# Patient Record
Sex: FEMALE | Race: OTHER | Employment: FULL TIME
[De-identification: names, ages, dates, MRNs, and addresses within clinical notes are randomized per-mention and may not be internally consistent; named-entity substitution may affect disease eponyms.]

---
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
## 2024-11-17 ENCOUNTER — HOSPITAL ENCOUNTER (EMERGENCY)
Age: 19
Discharge: HOME | End: 2024-11-17
Attending: STUDENT IN AN ORGANIZED HEALTH CARE EDUCATION/TRAINING PROGRAM
Payer: COMMERCIAL

## 2024-11-17 VITALS
HEIGHT: 61 IN | OXYGEN SATURATION: 98 % | SYSTOLIC BLOOD PRESSURE: 109 MMHG | WEIGHT: 125 LBS | BODY MASS INDEX: 23.6 KG/M2 | HEART RATE: 97 BPM | TEMPERATURE: 97.7 F | RESPIRATION RATE: 17 BRPM | DIASTOLIC BLOOD PRESSURE: 89 MMHG

## 2024-11-17 DIAGNOSIS — R10.30 LOWER ABDOMINAL PAIN: Primary | ICD-10-CM

## 2024-11-17 LAB
ALBUMIN SERPL-MCNC: 4.1 G/DL (ref 3.5–5.2)
ALP SERPL-CCNC: 67 U/L (ref 35–104)
ALT SERPL-CCNC: 9 U/L (ref 10–35)
ANION GAP SERPL CALCULATED.3IONS-SCNC: 10 MMOL/L (ref 9–16)
AST SERPL-CCNC: 17 U/L (ref 10–35)
B-HCG SERPL EIA 3RD IS-ACNC: <0.2 MIU/ML (ref 0–7)
BASOPHILS # BLD: 0 K/UL (ref 0–0.2)
BASOPHILS NFR BLD: 1 % (ref 0–2)
BILIRUB SERPL-MCNC: 0.4 MG/DL (ref 0–1.2)
BILIRUB UR QL STRIP: NEGATIVE
BUN SERPL-MCNC: 7 MG/DL (ref 6–20)
CALCIUM SERPL-MCNC: 9.1 MG/DL (ref 8.6–10.4)
CHLORIDE SERPL-SCNC: 104 MMOL/L (ref 98–107)
CLARITY UR: CLEAR
CO2 SERPL-SCNC: 24 MMOL/L (ref 20–31)
COLOR UR: YELLOW
COMMENT: NORMAL
CREAT SERPL-MCNC: 0.7 MG/DL (ref 0.7–1.2)
EOSINOPHIL # BLD: 0.1 K/UL (ref 0–0.4)
EOSINOPHILS RELATIVE PERCENT: 1 % (ref 0–4)
ERYTHROCYTE [DISTWIDTH] IN BLOOD BY AUTOMATED COUNT: 13.1 % (ref 11.5–14.9)
GFR, ESTIMATED: >90 ML/MIN/1.73M2
GLUCOSE SERPL-MCNC: 92 MG/DL (ref 74–99)
GLUCOSE UR STRIP-MCNC: NEGATIVE MG/DL
HCT VFR BLD AUTO: 46.1 % (ref 36–46)
HGB BLD-MCNC: 15.7 G/DL (ref 12–16)
HGB UR QL STRIP.AUTO: NEGATIVE
KETONES UR STRIP-MCNC: NEGATIVE MG/DL
LEUKOCYTE ESTERASE UR QL STRIP: NEGATIVE
LIPASE SERPL-CCNC: 100 U/L (ref 13–60)
LYMPHOCYTES NFR BLD: 3.4 K/UL (ref 1–4.8)
LYMPHOCYTES RELATIVE PERCENT: 35 % (ref 25–45)
MCH RBC QN AUTO: 30.5 PG (ref 26–34)
MCHC RBC AUTO-ENTMCNC: 34 G/DL (ref 31–37)
MCV RBC AUTO: 89.6 FL (ref 80–100)
MONOCYTES NFR BLD: 0.7 K/UL (ref 0.1–1.3)
MONOCYTES NFR BLD: 7 % (ref 2–8)
NEUTROPHILS NFR BLD: 56 % (ref 34–64)
NEUTS SEG NFR BLD: 5.5 K/UL (ref 1.3–9.1)
NITRITE UR QL STRIP: NEGATIVE
PH UR STRIP: 6 [PH] (ref 5–8)
PLATELET # BLD AUTO: 312 K/UL (ref 150–450)
PMV BLD AUTO: 8.4 FL (ref 6–12)
POTASSIUM SERPL-SCNC: 3.7 MMOL/L (ref 3.7–5.3)
PROT SERPL-MCNC: 7.1 G/DL (ref 6.6–8.7)
PROT UR STRIP-MCNC: NEGATIVE MG/DL
RBC # BLD AUTO: 5.15 M/UL (ref 4–5.2)
SODIUM SERPL-SCNC: 138 MMOL/L (ref 136–145)
SP GR UR STRIP: 1.01 (ref 1–1.03)
UROBILINOGEN UR STRIP-ACNC: NORMAL EU/DL (ref 0–1)
WBC OTHER # BLD: 9.7 K/UL (ref 4.5–13.5)

## 2024-11-17 PROCEDURE — 83690 ASSAY OF LIPASE: CPT

## 2024-11-17 PROCEDURE — 36415 COLL VENOUS BLD VENIPUNCTURE: CPT

## 2024-11-17 PROCEDURE — 80053 COMPREHEN METABOLIC PANEL: CPT

## 2024-11-17 PROCEDURE — 84702 CHORIONIC GONADOTROPIN TEST: CPT

## 2024-11-17 PROCEDURE — 81003 URINALYSIS AUTO W/O SCOPE: CPT

## 2024-11-17 PROCEDURE — 85025 COMPLETE CBC W/AUTO DIFF WBC: CPT

## 2024-11-17 PROCEDURE — 99283 EMERGENCY DEPT VISIT LOW MDM: CPT

## 2024-11-17 ASSESSMENT — PAIN - FUNCTIONAL ASSESSMENT: PAIN_FUNCTIONAL_ASSESSMENT: 0-10

## 2024-11-17 ASSESSMENT — PAIN DESCRIPTION - DESCRIPTORS: DESCRIPTORS: SHARP

## 2024-11-17 ASSESSMENT — PAIN DESCRIPTION - ORIENTATION: ORIENTATION: LOWER

## 2024-11-17 ASSESSMENT — PAIN SCALES - GENERAL: PAINLEVEL_OUTOF10: 7

## 2024-11-17 ASSESSMENT — LIFESTYLE VARIABLES
HOW MANY STANDARD DRINKS CONTAINING ALCOHOL DO YOU HAVE ON A TYPICAL DAY: PATIENT DOES NOT DRINK
HOW OFTEN DO YOU HAVE A DRINK CONTAINING ALCOHOL: NEVER

## 2024-11-17 ASSESSMENT — PAIN DESCRIPTION - LOCATION: LOCATION: ABDOMEN

## 2024-11-17 ASSESSMENT — PAIN DESCRIPTION - FREQUENCY: FREQUENCY: CONTINUOUS

## 2024-11-18 NOTE — ED PROVIDER NOTES
EMERGENCY DEPARTMENT ENCOUNTER   ATTENDING ATTESTATION     Pt Name: Corry Ruiz  MRN: [id number]  Birthdate 2005  Date of evaluation: 11/17/24       Corry Ruiz is a 19 y.o. female who presents with Abdominal Pain (Sharp lingering pain started 2 weeks, thought she was pregnant, did 3 self- pregnancy test at home showed 2 vague positive results, one was negative ) and Pregnancy Test (Patient wants to know if she is pregnant)    Lower abdominal pain suprapubic area    Normal vital signs, benign exam    Labs and pregnancy test    MDM:     Patient with reassuring workup    Lipase minimally elevated she has no epigastric tenderness is not 3 times the upper limit of normal    No pregnancy normal vitals otherwise    Will discharge    Vitals:   Vitals:    11/17/24 2125   BP: 109/89   Pulse: 97   Resp: 17   Temp: 97.7 °F (36.5 °C)   TempSrc: Oral   SpO2: 98%   Weight: 56.7 kg (125 lb)   Height: 1.549 m (5' 1\")         I personally saw and examined the patient. I have reviewed and agree with the resident's findings, including all diagnostic interpretations and treatment plan as written. I was present for the key portions of any procedures performed and the inclusive time noted for any critical care statement.    Slim Templeton MD  Attending Emergency Physician            Slim Templeton MD  11/18/24 0002    
round, and reactive to light.   Cardiovascular:      Rate and Rhythm: Normal rate and regular rhythm.      Pulses:           Radial pulses are 2+ on the right side and 2+ on the left side.        Dorsalis pedis pulses are 2+ on the right side and 2+ on the left side.      Heart sounds: Normal heart sounds.   Pulmonary:      Effort: Pulmonary effort is normal. No respiratory distress.      Breath sounds: Normal breath sounds.   Abdominal:      Palpations: Abdomen is soft.      Tenderness: There is no abdominal tenderness.   Musculoskeletal:         General: No tenderness. Normal range of motion.   Skin:     General: Skin is warm and dry.      Findings: No rash.   Neurological:      Mental Status: She is alert and oriented to person, place, and time.           DDX/DIAGNOSTIC RESULTS / EMERGENCY DEPARTMENT COURSE / MDM     Medical Decision Making  Amount and/or Complexity of Data Reviewed  Labs: ordered. Decision-making details documented in ED Course.    19-year-old female presented ED with lower abdominal pain and concern for pregnancy.  On physical examination patient was stable vital signs, afebrile with lower abdominal pain to palpation.  Will obtain CBC, CMP, lipase, hCG and UA.    EKG      All EKG's are interpreted by the Emergency Department Physician who either signs or Co-signs this chart in the absence of a cardiologist.    EMERGENCY DEPARTMENT COURSE:      ED Course as of 11/17/24 2326   Sun Nov 17, 2024 2316 CBC with Auto Differential(!):    WBC 9.7   RBC 5.15   Hemoglobin Quant 15.7   Hematocrit 46.1(!)   MCV 89.6   MCH 30.5   MCHC 34.0   RDW 13.1   Platelet Count 312   MPV 8.4   Neutrophils % 56   Lymphocyte % 35   Monocytes % 7   Eosinophils % 1   Basophils % 1   Neutrophils Absolute 5.50   Lymphocytes Absolute 3.40   Monocytes Absolute 0.70   Eosinophils Absolute 0.10   Basophils Absolute 0.00 [AS]   2316 Comprehensive Metabolic Panel(!):    Sodium 138   Potassium 3.7   Chloride 104   CARBON DIOXIDE

## 2024-11-18 NOTE — DISCHARGE INSTRUCTIONS
You are seen in the ED for abdominal pain.    Your labs came back normal.  You tested negative for pregnancy.    Please follow-up with your PCP if symptoms continue to persist over the next 3 to 5 days.  Information for such an outpatient appointment has been provided

## 2024-11-18 NOTE — ED NOTES
Mode of arrival (squad #, walk in, police, etc) : walk-in        C= \"Have you ever felt that you should Cut down on your drinking?\"  No  A= \"Have people Annoyed you by criticizing your drinking?\"  No  G= \"Have you ever felt bad or Guilty about your drinking?\"  No  E= \"Have you ever had a drink as an Eye-opener first thing in the morning to steady your nerves or to help a hangover?\"  No      Deferred []      Reason for deferring: N/A    *If yes to two or more: probable alcohol abuse.*

## 2025-07-18 PROCEDURE — 99245 OFF/OP CONSLTJ NEW/EST HI 55: CPT | Performed by: PSYCHIATRY & NEUROLOGY

## 2025-07-19 ENCOUNTER — HOSPITAL ENCOUNTER (OUTPATIENT)
Age: 20
Setting detail: OBSERVATION
Discharge: ANOTHER HOSPITAL | DRG: 756 | End: 2025-07-20
Attending: STUDENT IN AN ORGANIZED HEALTH CARE EDUCATION/TRAINING PROGRAM | Admitting: FAMILY MEDICINE
Payer: COMMERCIAL

## 2025-07-19 ENCOUNTER — APPOINTMENT (OUTPATIENT)
Dept: CT IMAGING | Age: 20
DRG: 756 | End: 2025-07-19
Payer: COMMERCIAL

## 2025-07-19 DIAGNOSIS — F10.920 ACUTE ALCOHOLIC INTOXICATION WITHOUT COMPLICATION: Primary | ICD-10-CM

## 2025-07-19 DIAGNOSIS — R45.851 SUICIDAL IDEATION: ICD-10-CM

## 2025-07-19 DIAGNOSIS — R56.9 SEIZURE-LIKE ACTIVITY (HCC): ICD-10-CM

## 2025-07-19 LAB
AMPHET UR QL SCN: NEGATIVE
APAP SERPL-MCNC: <5 UG/ML (ref 10–30)
BARBITURATES UR QL SCN: NEGATIVE
BASOPHILS # BLD: 0.03 K/UL (ref 0–0.2)
BASOPHILS NFR BLD: 0 % (ref 0–2)
BENZODIAZ UR QL: POSITIVE
BILIRUB UR QL STRIP: NEGATIVE
CANNABINOIDS UR QL SCN: NEGATIVE
CHP ED QC CHECK: NORMAL
CLARITY UR: ABNORMAL
COCAINE UR QL SCN: NEGATIVE
COLOR UR: YELLOW
EOSINOPHIL # BLD: <0.03 K/UL (ref 0–0.44)
EOSINOPHILS RELATIVE PERCENT: 0 % (ref 1–4)
EPI CELLS #/AREA URNS HPF: ABNORMAL /HPF (ref 0–5)
ERYTHROCYTE [DISTWIDTH] IN BLOOD BY AUTOMATED COUNT: 12.3 % (ref 11.8–14.4)
ETHANOL PERCENT: 0.13 %
ETHANOLAMINE SERPL-MCNC: 128 MG/DL (ref 0–0.08)
FENTANYL UR QL: NEGATIVE
GLUCOSE BLD-MCNC: 90 MG/DL
GLUCOSE BLD-MCNC: 90 MG/DL (ref 65–105)
GLUCOSE UR STRIP-MCNC: NEGATIVE MG/DL
HCG SERPL QL: NEGATIVE
HCG UR QL: NEGATIVE
HCT VFR BLD AUTO: 40.6 % (ref 36.3–47.1)
HGB BLD-MCNC: 14 G/DL (ref 11.9–15.1)
HGB UR QL STRIP.AUTO: ABNORMAL
IMM GRANULOCYTES # BLD AUTO: 0.07 K/UL (ref 0–0.3)
IMM GRANULOCYTES NFR BLD: 1 %
KETONES UR STRIP-MCNC: NEGATIVE MG/DL
LEUKOCYTE ESTERASE UR QL STRIP: ABNORMAL
LYMPHOCYTES NFR BLD: 1.35 K/UL (ref 1.1–3.7)
LYMPHOCYTES RELATIVE PERCENT: 10 % (ref 24–43)
MCH RBC QN AUTO: 30.8 PG (ref 25.2–33.5)
MCHC RBC AUTO-ENTMCNC: 34.5 G/DL (ref 28.4–34.8)
MCV RBC AUTO: 89.4 FL (ref 82.6–102.9)
METHADONE UR QL: NEGATIVE
MONOCYTES NFR BLD: 0.62 K/UL (ref 0.1–1.2)
MONOCYTES NFR BLD: 5 % (ref 3–12)
NEUTROPHILS NFR BLD: 84 % (ref 36–65)
NEUTS SEG NFR BLD: 10.88 K/UL (ref 1.5–8.1)
NITRITE UR QL STRIP: NEGATIVE
NRBC BLD-RTO: 0 PER 100 WBC
OPIATES UR QL SCN: NEGATIVE
OXYCODONE UR QL SCN: NEGATIVE
PCP UR QL SCN: NEGATIVE
PH UR STRIP: 6 [PH] (ref 5–8)
PLATELET # BLD AUTO: 301 K/UL (ref 138–453)
PMV BLD AUTO: 9.7 FL (ref 8.1–13.5)
PROT UR STRIP-MCNC: NEGATIVE MG/DL
RBC # BLD AUTO: 4.54 M/UL (ref 3.95–5.11)
RBC #/AREA URNS HPF: ABNORMAL /HPF (ref 0–2)
SALICYLATES SERPL-MCNC: <0.5 MG/DL (ref 0–10)
SP GR UR STRIP: 1.02 (ref 1–1.03)
TEST INFORMATION: ABNORMAL
UROBILINOGEN UR STRIP-ACNC: NORMAL EU/DL (ref 0–1)
WBC #/AREA URNS HPF: ABNORMAL /HPF (ref 0–5)
WBC OTHER # BLD: 13 K/UL (ref 3.5–11.3)

## 2025-07-19 PROCEDURE — 96376 TX/PRO/DX INJ SAME DRUG ADON: CPT

## 2025-07-19 PROCEDURE — 84703 CHORIONIC GONADOTROPIN ASSAY: CPT

## 2025-07-19 PROCEDURE — 85025 COMPLETE CBC W/AUTO DIFF WBC: CPT

## 2025-07-19 PROCEDURE — 96375 TX/PRO/DX INJ NEW DRUG ADDON: CPT

## 2025-07-19 PROCEDURE — 2500000003 HC RX 250 WO HCPCS: Performed by: PSYCHIATRY & NEUROLOGY

## 2025-07-19 PROCEDURE — G0480 DRUG TEST DEF 1-7 CLASSES: HCPCS

## 2025-07-19 PROCEDURE — 6370000000 HC RX 637 (ALT 250 FOR IP): Performed by: EMERGENCY MEDICINE

## 2025-07-19 PROCEDURE — 80307 DRUG TEST PRSMV CHEM ANLYZR: CPT

## 2025-07-19 PROCEDURE — 80179 DRUG ASSAY SALICYLATE: CPT

## 2025-07-19 PROCEDURE — 96372 THER/PROPH/DIAG INJ SC/IM: CPT

## 2025-07-19 PROCEDURE — 2580000003 HC RX 258: Performed by: FAMILY MEDICINE

## 2025-07-19 PROCEDURE — 94761 N-INVAS EAR/PLS OXIMETRY MLT: CPT

## 2025-07-19 PROCEDURE — 81001 URINALYSIS AUTO W/SCOPE: CPT

## 2025-07-19 PROCEDURE — 6360000002 HC RX W HCPCS

## 2025-07-19 PROCEDURE — 99285 EMERGENCY DEPT VISIT HI MDM: CPT

## 2025-07-19 PROCEDURE — 6360000002 HC RX W HCPCS: Performed by: STUDENT IN AN ORGANIZED HEALTH CARE EDUCATION/TRAINING PROGRAM

## 2025-07-19 PROCEDURE — 96374 THER/PROPH/DIAG INJ IV PUSH: CPT

## 2025-07-19 PROCEDURE — G0378 HOSPITAL OBSERVATION PER HR: HCPCS

## 2025-07-19 PROCEDURE — 6370000000 HC RX 637 (ALT 250 FOR IP): Performed by: FAMILY MEDICINE

## 2025-07-19 PROCEDURE — 80143 DRUG ASSAY ACETAMINOPHEN: CPT

## 2025-07-19 PROCEDURE — 70450 CT HEAD/BRAIN W/O DYE: CPT

## 2025-07-19 PROCEDURE — 81025 URINE PREGNANCY TEST: CPT

## 2025-07-19 PROCEDURE — 6360000002 HC RX W HCPCS: Performed by: FAMILY MEDICINE

## 2025-07-19 PROCEDURE — 82947 ASSAY GLUCOSE BLOOD QUANT: CPT

## 2025-07-19 RX ORDER — LORAZEPAM 2 MG/ML
4 INJECTION INTRAMUSCULAR
Status: DISCONTINUED | OUTPATIENT
Start: 2025-07-19 | End: 2025-07-20 | Stop reason: HOSPADM

## 2025-07-19 RX ORDER — ONDANSETRON 4 MG/1
4 TABLET, ORALLY DISINTEGRATING ORAL EVERY 8 HOURS PRN
Status: DISCONTINUED | OUTPATIENT
Start: 2025-07-19 | End: 2025-07-20

## 2025-07-19 RX ORDER — GAUZE BANDAGE 2" X 2"
100 BANDAGE TOPICAL DAILY
Status: DISCONTINUED | OUTPATIENT
Start: 2025-07-19 | End: 2025-07-20

## 2025-07-19 RX ORDER — IBUPROFEN 600 MG/1
600 TABLET, FILM COATED ORAL ONCE
Status: COMPLETED | OUTPATIENT
Start: 2025-07-19 | End: 2025-07-19

## 2025-07-19 RX ORDER — MIDAZOLAM HYDROCHLORIDE 2 MG/2ML
2 INJECTION, SOLUTION INTRAMUSCULAR; INTRAVENOUS ONCE
Status: DISCONTINUED | OUTPATIENT
Start: 2025-07-19 | End: 2025-07-20 | Stop reason: HOSPADM

## 2025-07-19 RX ORDER — MIDAZOLAM HYDROCHLORIDE 2 MG/2ML
2 INJECTION, SOLUTION INTRAMUSCULAR; INTRAVENOUS ONCE
Status: COMPLETED | OUTPATIENT
Start: 2025-07-19 | End: 2025-07-19

## 2025-07-19 RX ORDER — ACETAMINOPHEN 325 MG/1
650 TABLET ORAL EVERY 6 HOURS PRN
Status: DISCONTINUED | OUTPATIENT
Start: 2025-07-19 | End: 2025-07-20

## 2025-07-19 RX ORDER — SODIUM CHLORIDE 9 MG/ML
INJECTION, SOLUTION INTRAVENOUS CONTINUOUS
Status: DISCONTINUED | OUTPATIENT
Start: 2025-07-19 | End: 2025-07-20 | Stop reason: HOSPADM

## 2025-07-19 RX ORDER — LORAZEPAM 1 MG/1
1 TABLET ORAL
Status: DISCONTINUED | OUTPATIENT
Start: 2025-07-19 | End: 2025-07-20 | Stop reason: HOSPADM

## 2025-07-19 RX ORDER — MAGNESIUM SULFATE IN WATER 40 MG/ML
2000 INJECTION, SOLUTION INTRAVENOUS PRN
Status: DISCONTINUED | OUTPATIENT
Start: 2025-07-19 | End: 2025-07-20

## 2025-07-19 RX ORDER — LORAZEPAM 2 MG/ML
2 INJECTION INTRAMUSCULAR
Status: DISCONTINUED | OUTPATIENT
Start: 2025-07-19 | End: 2025-07-20 | Stop reason: HOSPADM

## 2025-07-19 RX ORDER — FOLIC ACID 1 MG/1
1 TABLET ORAL DAILY
Status: DISCONTINUED | OUTPATIENT
Start: 2025-07-19 | End: 2025-07-20 | Stop reason: HOSPADM

## 2025-07-19 RX ORDER — ONDANSETRON 2 MG/ML
4 INJECTION INTRAMUSCULAR; INTRAVENOUS EVERY 6 HOURS PRN
Status: DISCONTINUED | OUTPATIENT
Start: 2025-07-19 | End: 2025-07-20

## 2025-07-19 RX ORDER — LORAZEPAM 1 MG/1
4 TABLET ORAL
Status: DISCONTINUED | OUTPATIENT
Start: 2025-07-19 | End: 2025-07-20 | Stop reason: HOSPADM

## 2025-07-19 RX ORDER — FAMOTIDINE 20 MG/1
20 TABLET, FILM COATED ORAL 2 TIMES DAILY
Status: DISCONTINUED | OUTPATIENT
Start: 2025-07-19 | End: 2025-07-20 | Stop reason: HOSPADM

## 2025-07-19 RX ORDER — MAGNESIUM HYDROXIDE/ALUMINUM HYDROXICE/SIMETHICONE 120; 1200; 1200 MG/30ML; MG/30ML; MG/30ML
30 SUSPENSION ORAL EVERY 6 HOURS PRN
Status: DISCONTINUED | OUTPATIENT
Start: 2025-07-19 | End: 2025-07-20 | Stop reason: HOSPADM

## 2025-07-19 RX ORDER — FOLIC ACID 1 MG/1
1 TABLET ORAL DAILY
Status: DISCONTINUED | OUTPATIENT
Start: 2025-07-20 | End: 2025-07-19

## 2025-07-19 RX ORDER — POLYETHYLENE GLYCOL 3350 17 G/17G
17 POWDER, FOR SOLUTION ORAL DAILY PRN
Status: DISCONTINUED | OUTPATIENT
Start: 2025-07-19 | End: 2025-07-20

## 2025-07-19 RX ORDER — LORAZEPAM 2 MG/ML
1 INJECTION INTRAMUSCULAR EVERY 4 HOURS PRN
Status: DISCONTINUED | OUTPATIENT
Start: 2025-07-19 | End: 2025-07-20

## 2025-07-19 RX ORDER — LEVETIRACETAM 500 MG/5ML
1500 INJECTION, SOLUTION, CONCENTRATE INTRAVENOUS ONCE
Status: COMPLETED | OUTPATIENT
Start: 2025-07-19 | End: 2025-07-19

## 2025-07-19 RX ORDER — ACETAMINOPHEN 650 MG/1
650 SUPPOSITORY RECTAL EVERY 6 HOURS PRN
Status: DISCONTINUED | OUTPATIENT
Start: 2025-07-19 | End: 2025-07-20

## 2025-07-19 RX ORDER — MIDAZOLAM HYDROCHLORIDE 1 MG/ML
INJECTION, SOLUTION INTRAMUSCULAR; INTRAVENOUS
Status: COMPLETED
Start: 2025-07-19 | End: 2025-07-19

## 2025-07-19 RX ORDER — GAUZE BANDAGE 2" X 2"
100 BANDAGE TOPICAL DAILY
Status: DISCONTINUED | OUTPATIENT
Start: 2025-07-20 | End: 2025-07-19

## 2025-07-19 RX ORDER — LORAZEPAM 2 MG/ML
3 INJECTION INTRAMUSCULAR
Status: DISCONTINUED | OUTPATIENT
Start: 2025-07-19 | End: 2025-07-20 | Stop reason: HOSPADM

## 2025-07-19 RX ORDER — MULTIVITAMIN WITH IRON
1 TABLET ORAL DAILY
Status: DISCONTINUED | OUTPATIENT
Start: 2025-07-19 | End: 2025-07-20

## 2025-07-19 RX ORDER — ONDANSETRON 2 MG/ML
4 INJECTION INTRAMUSCULAR; INTRAVENOUS ONCE
Status: COMPLETED | OUTPATIENT
Start: 2025-07-19 | End: 2025-07-19

## 2025-07-19 RX ORDER — LORAZEPAM 2 MG/ML
1 INJECTION INTRAMUSCULAR
Status: DISCONTINUED | OUTPATIENT
Start: 2025-07-19 | End: 2025-07-20 | Stop reason: HOSPADM

## 2025-07-19 RX ORDER — POTASSIUM CHLORIDE 1500 MG/1
40 TABLET, EXTENDED RELEASE ORAL PRN
Status: DISCONTINUED | OUTPATIENT
Start: 2025-07-19 | End: 2025-07-20

## 2025-07-19 RX ORDER — LORAZEPAM 1 MG/1
2 TABLET ORAL
Status: DISCONTINUED | OUTPATIENT
Start: 2025-07-19 | End: 2025-07-20 | Stop reason: HOSPADM

## 2025-07-19 RX ORDER — POTASSIUM CHLORIDE 7.45 MG/ML
10 INJECTION INTRAVENOUS PRN
Status: DISCONTINUED | OUTPATIENT
Start: 2025-07-19 | End: 2025-07-20

## 2025-07-19 RX ORDER — LORAZEPAM 1 MG/1
3 TABLET ORAL
Status: DISCONTINUED | OUTPATIENT
Start: 2025-07-19 | End: 2025-07-20 | Stop reason: HOSPADM

## 2025-07-19 RX ADMIN — ACETAMINOPHEN 650 MG: 325 TABLET ORAL at 16:21

## 2025-07-19 RX ADMIN — MIDAZOLAM HYDROCHLORIDE 2 MG: 1 INJECTION, SOLUTION INTRAMUSCULAR; INTRAVENOUS at 04:01

## 2025-07-19 RX ADMIN — ONDANSETRON 4 MG: 2 INJECTION, SOLUTION INTRAMUSCULAR; INTRAVENOUS at 04:16

## 2025-07-19 RX ADMIN — MULTIVITAMIN TABLET 1 TABLET: TABLET at 16:21

## 2025-07-19 RX ADMIN — IBUPROFEN 600 MG: 600 TABLET ORAL at 13:08

## 2025-07-19 RX ADMIN — MIDAZOLAM HYDROCHLORIDE 2 MG: 1 INJECTION, SOLUTION INTRAMUSCULAR; INTRAVENOUS at 04:45

## 2025-07-19 RX ADMIN — LEVETIRACETAM 1500 MG: 100 INJECTION INTRAVENOUS at 04:17

## 2025-07-19 RX ADMIN — Medication 100 MG: at 16:21

## 2025-07-19 RX ADMIN — BRIVARACETAM 200 MG: 50 INJECTION, SUSPENSION INTRAVENOUS at 21:41

## 2025-07-19 RX ADMIN — Medication 1 MG: at 16:40

## 2025-07-19 RX ADMIN — MIDAZOLAM HYDROCHLORIDE 2 MG: 1 INJECTION, SOLUTION INTRAMUSCULAR; INTRAVENOUS at 05:20

## 2025-07-19 RX ADMIN — FOLIC ACID 1 MG: 1 TABLET ORAL at 16:21

## 2025-07-19 RX ADMIN — SODIUM CHLORIDE: 0.9 INJECTION, SOLUTION INTRAVENOUS at 21:00

## 2025-07-19 RX ADMIN — Medication 1 MG: at 21:06

## 2025-07-19 ASSESSMENT — PAIN SCALES - GENERAL: PAINLEVEL_OUTOF10: 3

## 2025-07-19 NOTE — ED NOTES
Pt actively seizing. Lasted approx 45 seconds. Pt talking post seizure and was able to give us her name and . Pts mother was called to inform her of her daughter being in the hospital.

## 2025-07-19 NOTE — PROGRESS NOTES
Patient having seizure like activity while writer is in the room. Episode starts with patient expressing anxiety, SOB, and chest tightness. Patient becomes intermittently tachycardic and tachypnic then closes eyes, begins to have episodes of clonic appearing movements lasting approximately 45 seconds. Immediately after the patient is able to open eyes to name and answer questions while tachycardia and tachypnia resolve. She complains of numbness and tingling in hands, feet, and lips. PRN order for ativan administered. Neurologist present immediately \"post ictal\" for exam
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
## 2025-07-19 NOTE — ED PROVIDER NOTES
ADDENDUM:      Care of this patient was assumed from Dr. Cooley.  The patient was seen for AMS.  The patient's initial evaluation and plan have been discussed with the prior provider who initially evaluated the patient.  Nursing Notes, Past Medical Hx, Past Surgical Hx, Social Hx, Allergies, and Family Hx were all reviewed.    RECENT VITALS:  BP: 117/75, Temp: 97.7 °F (36.5 °C), Pulse: 80, Respirations: 15     Medical Decision Making      Patient presented acutely intoxicated.  She was found to have intermittent seizure-like activity though it is questionable whether this was true seizures versus pseudoseizures.  She was treated with Versed.  She was found to have elevated ethanol levels as expected.  Once clinically sober, she expressed thoughts of suicidal ideation with plans to drink herself to death.  I discussed this with the patient and she confirms that she has had thoughts of wanting to no longer be alive.  She admits to a history of psychiatric admission in the past when she was 15 years old.  She is not currently on any psychiatric medications.  She is agreeable to psychiatric evaluation at [facility] and will be transferred for further care.  She is medically clear at this time.    Disposition   CLINICAL IMPRESSION:  1. Acute alcoholic intoxication without complication    2. Suicidal ideation        PATIENT REFERRED TO:  No follow-up provider specified.    DISCHARGE MEDICATIONS:  New Prescriptions    No medications on file       DISPOSITION:   Transfer to [facility] mental health unit - medically cleared for admission     (Please note that portions of this note were completed with a voice recognition program.  Efforts were made to edit the dictations but occasionally words are mis-transcribed.)         Daniel Arroyo MD  07/19/25 3311

## 2025-07-19 NOTE — ED NOTES
[x] [facility]    [] [facility]    []  [place] [facility]       SUICIDE/SECURITY WATCH EMERGENCY DEPARTMENT    Orders    [x]  Suicide/Security Watch Precautions initiated as checked below:   7/19/25 9:06 AM EDT STA10/10    [x] Notified physician:  Daniel Arroyo MD  7/19/25 9:06 AM EDT    [x] Orders obtained as appropriate:     [x] 1:1 Observer    [x] 1:1 Observer, Notified by:  Inna Taylor, RN    Contact Nurse Supervisor    [x] Safety Meal Tray Ordered, if applicable, via phone to kitchen (no EPIC order)    [x] Remove all personal clothes from room, placed in belonging bag, and placed behind nurses station.Patients placed in snap/paper gown/pants.     [x] Remove all personal belongings from room and secured away from patient.   All personal clothing and belongings include the following:    [x] Shirt              [] Hat      []   [x] Pants             [x] Necklace/ring/watch   [] Purse  [] Coat/Jacket            [] Wallet      [] Other: Please document in EPIC  [] Socks             [x] Cell phone     [] Other: Please document in EPIC  [x] Shoes/boots/slippers        [] Tablet      [] Other: Please document in EPIC         Documentation     [x] Suicide Precautions documented under Required Documentation       [x] C-SSRS Screening and C-SSRS Risk Assessment Completed     [x] Patient placed in \"Suicide Precautions\" in Epic     [x] Initiate paper constant observer flowsheet with      [x] 1:1 Constant Observer order in place (if HIGH RISK); instructions provided.  Suicide precautions require observer be within 4-7 feet      [x] Initiate every 15 minute observations per observer as delegated by the RN.     [x] Initiate RN assessment and documentation      These items or items similar should be removed from the room and belongings searched by [facility]. Personal belongings then secured in nurses station:(Patient has the right to call-light. 1:1 sitter allows this)   [x] Chairs

## 2025-07-19 NOTE — ED NOTES
Spoke with pts mother a few times. She states that pt has been admitted to the hospital 4 other times due to alcohol related seizures.

## 2025-07-19 NOTE — ED NOTES
Pt states she wants to leave and states she is no longer suicidal. MD aware.   Pt states \"What happens if I just leave?   Writer reminds patient she is \"pink slipped\" and what that entails.

## 2025-07-19 NOTE — ED PROVIDER NOTES
[facility] EMERGENCY DEPARTMENT ENCOUNTER      Pt Name: Corry Ruiz  MRN: [id number]  Birthdate 2005  Date of evaluation: 7/19/25    CHIEF COMPLAINT       Chief Complaint   Patient presents with    Altered Mental Status     EMS was called for seizure like activity. Pt has alcohol on board. Pt is unknown to us at this time       HISTORY OF PRESENT ILLNESS   Corry Ruiz is a 20 y.o. female who presents with altered mental status, intoxication, reported seizure-like activity.  EMS was called to a local hotel when patient's friend states that she is having seizures.  History initially is limited.  After patient awakens she did states she does have history of seizures especially when drinking.  On no medications for it.  Stating she has been drinking due to her depression and has been drinking and attempted self-harm or self    PASTMEDICAL HISTORY   No past medical history on file.  Past Problem List  Patient Active Problem List   Diagnosis Code    Suicidal ideation R45.851       SURGICAL HISTORY     No past surgical history on file.    CURRENT MEDICATIONS     There are no discharge medications for this patient.      ALLERGIES     has no allergies on file.    FAMILY HISTORY     has no family status information on file.        SOCIAL HISTORY          PHYSICAL EXAM     INITIAL VITALS: BP 98/63   Pulse 63   Temp 97.9 °F (36.6 °C) (Oral)   Resp 16   SpO2 99%    Physical Exam  Vitals and nursing note reviewed.   Constitutional:       General: She is not in acute distress.     Appearance: She is well-developed. She is not ill-appearing or toxic-appearing.   HENT:      Head: Normocephalic and atraumatic.   Eyes:      General: No scleral icterus.     Conjunctiva/sclera: Conjunctivae normal.      Pupils: Pupils are equal, round, and reactive to light.   Cardiovascular:      Rate and Rhythm: Normal rate.   Pulmonary:      Effort: Pulmonary effort is normal. No respiratory distress.   Musculoskeletal:         General:  Normal range of motion.   Skin:     General: Skin is warm and dry.      Findings: No erythema or rash.   Neurological:      Mental Status: She is alert.      Comments: Patient is awake alert answering questions appropriately initially somnolent and postictal appearing   Psychiatric:         Behavior: Behavior normal.         MEDICAL DECISION MAKING / ED COURSE:   Summary of Patient Presentation:      1)  Number and Complexity of Problems  Problem List This Visit:    1. Acute alcoholic intoxication without complication    2. Suicidal ideation    3. Seizure-like activity (HCC)        Differential Diagnosis: Intracranial hemorrhage, postictal state    Diagnoses Considered but Do Not Suspect: Pneumonia, appendicitis    Pertinent Comorbid Conditions:  No past medical history on file.    2)  Data Reviewed    External Documents Reviewed: Previous ER visits reviewed by myself      3)  Treatment and Disposition  [Patient repeat assessment, Disposition discussion with patient/family, Case discussed with consulting clinician, MIPS, Social determinants of health impacting treatment or disposition, Shared Decision Making, Code Status Discussion:]    Patient had multiple episodes of seizure-like activity.  Does not appear epileptic in nature with no tongue biting, urinary incontinence.  Significant hyperventilation.  Given Keppra.  Signout on physician awaiting completion of workup.  Will need BHI placement.  CT imaging was obtained however is not crossing and chart is negative.    DATA FOR LAB AND RADIOLOGY TESTS ORDERED BELOW ARE REVIEWED BY THE ED CLINICIAN:    RADIOLOGY: All x-rays, CT, MRI, and formal ultrasound images (except ED bedside ultrasound) are read by the radiologist, see reports below, unless otherwise noted in MDM or here.  Reports below are reviewed by myself.  CT HEAD WO CONTRAST    (Results Pending)       LABS: Lab orders shown below, the results are reviewed by myself, and all abnormals are listed

## 2025-07-19 NOTE — PROGRESS NOTES
Dr Hopkins requesting records of previous seizure activity and neuro work up. No such records found on chart review. No such records found on chart review of linked patient record. No such records found via care everywhere. Attempted to call [facility] records but no answer; they are closed on the weekend. Please attempt to obtain records on Monday
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
## 2025-07-19 NOTE — PROGRESS NOTES
Dr Hopkins ordered EEG. Call placed to the performing department. Notified that they will complete the procedure tomorrow.

## 2025-07-19 NOTE — ED NOTES
Pt tearful and asked for mental health help. Pt states she was drinking last night so she didn't have to feel things. Pt asked for
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
## 2025-07-19 NOTE — PROGRESS NOTES
Dr Hopkins notified of EEG being done tomorrow, ER visit at [facility] on 1/29/25 referring to patient \"shaking\". Writer clarifies whether or not patient should be on an schedule anti-seizure meds. Awaiting orders

## 2025-07-20 ENCOUNTER — APPOINTMENT (OUTPATIENT)
Dept: MRI IMAGING | Age: 20
DRG: 756 | End: 2025-07-20
Payer: COMMERCIAL

## 2025-07-20 ENCOUNTER — HOSPITAL ENCOUNTER (INPATIENT)
Age: 20
LOS: 3 days | Discharge: HOME | DRG: 880 | End: 2025-07-23
Attending: STUDENT IN AN ORGANIZED HEALTH CARE EDUCATION/TRAINING PROGRAM | Admitting: PSYCHIATRY & NEUROLOGY
Payer: MEDICAID

## 2025-07-20 VITALS
WEIGHT: 148.59 LBS | HEART RATE: 108 BPM | RESPIRATION RATE: 19 BRPM | SYSTOLIC BLOOD PRESSURE: 109 MMHG | BODY MASS INDEX: 24.76 KG/M2 | HEIGHT: 65 IN | TEMPERATURE: 97.8 F | OXYGEN SATURATION: 98 % | DIASTOLIC BLOOD PRESSURE: 65 MMHG

## 2025-07-20 DIAGNOSIS — R56.9 SEIZURE-LIKE ACTIVITY (HCC): Primary | ICD-10-CM

## 2025-07-20 DIAGNOSIS — F10.920 ACUTE ALCOHOLIC INTOXICATION WITHOUT COMPLICATION: ICD-10-CM

## 2025-07-20 DIAGNOSIS — F10.10 ALCOHOL ABUSE: ICD-10-CM

## 2025-07-20 DIAGNOSIS — G40.901 STATUS EPILEPTICUS (HCC): ICD-10-CM

## 2025-07-20 PROBLEM — G40.909 RECURRENT SEIZURES (HCC): Status: ACTIVE | Noted: 2025-07-20

## 2025-07-20 PROBLEM — Z86.59 HISTORY OF SUICIDAL IDEATION: Status: ACTIVE | Noted: 2025-07-20

## 2025-07-20 PROBLEM — F33.0 MILD EPISODE OF RECURRENT MAJOR DEPRESSIVE DISORDER: Status: ACTIVE | Noted: 2025-07-20

## 2025-07-20 LAB
ANION GAP SERPL CALCULATED.3IONS-SCNC: 12 MMOL/L (ref 9–16)
BASOPHILS # BLD: <0.03 K/UL (ref 0–0.2)
BASOPHILS NFR BLD: 0 % (ref 0–2)
BUN SERPL-MCNC: 11 MG/DL (ref 6–20)
CALCIUM SERPL-MCNC: 8.9 MG/DL (ref 8.6–10.4)
CHLORIDE SERPL-SCNC: 109 MMOL/L (ref 98–107)
CO2 SERPL-SCNC: 19 MMOL/L (ref 20–31)
CREAT SERPL-MCNC: 0.7 MG/DL (ref 0.6–0.9)
EOSINOPHIL # BLD: 0.07 K/UL (ref 0–0.44)
EOSINOPHILS RELATIVE PERCENT: 1 % (ref 1–4)
ERYTHROCYTE [DISTWIDTH] IN BLOOD BY AUTOMATED COUNT: 12.4 % (ref 11.8–14.4)
GFR, ESTIMATED: >90 ML/MIN/1.73M2
GLUCOSE SERPL-MCNC: 81 MG/DL (ref 74–99)
HCT VFR BLD AUTO: 39.3 % (ref 36.3–47.1)
HGB BLD-MCNC: 12.7 G/DL (ref 11.9–15.1)
IMM GRANULOCYTES # BLD AUTO: 0.03 K/UL (ref 0–0.3)
IMM GRANULOCYTES NFR BLD: 0 %
LYMPHOCYTES NFR BLD: 3.06 K/UL (ref 1.1–3.7)
LYMPHOCYTES RELATIVE PERCENT: 35 % (ref 24–43)
MCH RBC QN AUTO: 29.5 PG (ref 25.2–33.5)
MCHC RBC AUTO-ENTMCNC: 32.3 G/DL (ref 28.4–34.8)
MCV RBC AUTO: 91.2 FL (ref 82.6–102.9)
MONOCYTES NFR BLD: 0.68 K/UL (ref 0.1–1.2)
MONOCYTES NFR BLD: 8 % (ref 3–12)
NEUTROPHILS NFR BLD: 56 % (ref 36–65)
NEUTS SEG NFR BLD: 4.97 K/UL (ref 1.5–8.1)
NRBC BLD-RTO: 0 PER 100 WBC
PLATELET # BLD AUTO: 318 K/UL (ref 138–453)
PMV BLD AUTO: 9.9 FL (ref 8.1–13.5)
POTASSIUM SERPL-SCNC: 3.6 MMOL/L (ref 3.7–5.3)
RBC # BLD AUTO: 4.31 M/UL (ref 3.95–5.11)
SODIUM SERPL-SCNC: 140 MMOL/L (ref 136–145)
WBC OTHER # BLD: 8.8 K/UL (ref 3.5–11.3)

## 2025-07-20 PROCEDURE — 99253 IP/OBS CNSLTJ NEW/EST LOW 45: CPT

## 2025-07-20 PROCEDURE — 72158 MRI LUMBAR SPINE W/O & W/DYE: CPT

## 2025-07-20 PROCEDURE — A9579 GAD-BASE MR CONTRAST NOS,1ML: HCPCS | Performed by: PSYCHIATRY & NEUROLOGY

## 2025-07-20 PROCEDURE — 2060000000 HC ICU INTERMEDIATE R&B

## 2025-07-20 PROCEDURE — 36415 COLL VENOUS BLD VENIPUNCTURE: CPT

## 2025-07-20 PROCEDURE — 99223 1ST HOSP IP/OBS HIGH 75: CPT | Performed by: PSYCHIATRY & NEUROLOGY

## 2025-07-20 PROCEDURE — 70553 MRI BRAIN STEM W/O & W/DYE: CPT

## 2025-07-20 PROCEDURE — 6360000004 HC RX CONTRAST MEDICATION: Performed by: PSYCHIATRY & NEUROLOGY

## 2025-07-20 PROCEDURE — G0378 HOSPITAL OBSERVATION PER HR: HCPCS

## 2025-07-20 PROCEDURE — 85025 COMPLETE CBC W/AUTO DIFF WBC: CPT

## 2025-07-20 PROCEDURE — 95711 VEEG 2-12 HR UNMONITORED: CPT

## 2025-07-20 PROCEDURE — 95813 EEG EXTND MNTR 61-119 MIN: CPT | Performed by: STUDENT IN AN ORGANIZED HEALTH CARE EDUCATION/TRAINING PROGRAM

## 2025-07-20 PROCEDURE — 2500000003 HC RX 250 WO HCPCS

## 2025-07-20 PROCEDURE — 6370000000 HC RX 637 (ALT 250 FOR IP)

## 2025-07-20 PROCEDURE — 80048 BASIC METABOLIC PNL TOTAL CA: CPT

## 2025-07-20 PROCEDURE — 6360000002 HC RX W HCPCS

## 2025-07-20 PROCEDURE — 6360000002 HC RX W HCPCS: Performed by: PSYCHIATRY & NEUROLOGY

## 2025-07-20 PROCEDURE — 96376 TX/PRO/DX INJ SAME DRUG ADON: CPT

## 2025-07-20 PROCEDURE — 6370000000 HC RX 637 (ALT 250 FOR IP): Performed by: STUDENT IN AN ORGANIZED HEALTH CARE EDUCATION/TRAINING PROGRAM

## 2025-07-20 PROCEDURE — 95813 EEG EXTND MNTR 61-119 MIN: CPT

## 2025-07-20 PROCEDURE — 93005 ELECTROCARDIOGRAM TRACING: CPT

## 2025-07-20 PROCEDURE — 95700 EEG CONT REC W/VID EEG TECH: CPT

## 2025-07-20 RX ORDER — LORAZEPAM 1 MG/1
1 TABLET ORAL
Status: DISCONTINUED | OUTPATIENT
Start: 2025-07-20 | End: 2025-07-23 | Stop reason: HOSPADM

## 2025-07-20 RX ORDER — SODIUM CHLORIDE 9 MG/ML
INJECTION, SOLUTION INTRAVENOUS PRN
Status: DISCONTINUED | OUTPATIENT
Start: 2025-07-20 | End: 2025-07-23 | Stop reason: HOSPADM

## 2025-07-20 RX ORDER — ONDANSETRON 4 MG/1
4 TABLET, ORALLY DISINTEGRATING ORAL EVERY 8 HOURS PRN
Status: DISCONTINUED | OUTPATIENT
Start: 2025-07-20 | End: 2025-07-23 | Stop reason: HOSPADM

## 2025-07-20 RX ORDER — LORAZEPAM 2 MG/ML
1 INJECTION INTRAMUSCULAR ONCE
Status: COMPLETED | OUTPATIENT
Start: 2025-07-20 | End: 2025-07-20

## 2025-07-20 RX ORDER — SODIUM CHLORIDE 0.9 % (FLUSH) 0.9 %
5-40 SYRINGE (ML) INJECTION PRN
Status: DISCONTINUED | OUTPATIENT
Start: 2025-07-20 | End: 2025-07-23 | Stop reason: HOSPADM

## 2025-07-20 RX ORDER — ONDANSETRON 2 MG/ML
4 INJECTION INTRAMUSCULAR; INTRAVENOUS EVERY 6 HOURS PRN
Status: DISCONTINUED | OUTPATIENT
Start: 2025-07-20 | End: 2025-07-23 | Stop reason: HOSPADM

## 2025-07-20 RX ORDER — LAMOTRIGINE 25 MG/1
50 TABLET ORAL 2 TIMES DAILY
Status: DISCONTINUED | OUTPATIENT
Start: 2025-07-20 | End: 2025-07-20

## 2025-07-20 RX ORDER — POLYETHYLENE GLYCOL 3350 17 G/17G
17 POWDER, FOR SOLUTION ORAL DAILY PRN
Status: DISCONTINUED | OUTPATIENT
Start: 2025-07-20 | End: 2025-07-23 | Stop reason: HOSPADM

## 2025-07-20 RX ORDER — LORAZEPAM 2 MG/ML
2 INJECTION INTRAMUSCULAR
Status: DISCONTINUED | OUTPATIENT
Start: 2025-07-20 | End: 2025-07-23 | Stop reason: HOSPADM

## 2025-07-20 RX ORDER — POTASSIUM CHLORIDE 7.45 MG/ML
10 INJECTION INTRAVENOUS PRN
Status: DISCONTINUED | OUTPATIENT
Start: 2025-07-20 | End: 2025-07-23 | Stop reason: HOSPADM

## 2025-07-20 RX ORDER — LORAZEPAM 2 MG/ML
1 INJECTION INTRAMUSCULAR
Status: DISCONTINUED | OUTPATIENT
Start: 2025-07-20 | End: 2025-07-20

## 2025-07-20 RX ORDER — MAGNESIUM SULFATE IN WATER 40 MG/ML
2000 INJECTION, SOLUTION INTRAVENOUS PRN
Status: DISCONTINUED | OUTPATIENT
Start: 2025-07-20 | End: 2025-07-23 | Stop reason: HOSPADM

## 2025-07-20 RX ORDER — ENOXAPARIN SODIUM 100 MG/ML
40 INJECTION SUBCUTANEOUS DAILY
Status: DISCONTINUED | OUTPATIENT
Start: 2025-07-20 | End: 2025-07-23 | Stop reason: HOSPADM

## 2025-07-20 RX ORDER — MULTIVITAMIN WITH IRON
1 TABLET ORAL DAILY
Status: DISCONTINUED | OUTPATIENT
Start: 2025-07-20 | End: 2025-07-23 | Stop reason: HOSPADM

## 2025-07-20 RX ORDER — LANOLIN ALCOHOL/MO/W.PET/CERES
100 CREAM (GRAM) TOPICAL DAILY
Status: DISCONTINUED | OUTPATIENT
Start: 2025-07-20 | End: 2025-07-23 | Stop reason: HOSPADM

## 2025-07-20 RX ORDER — SODIUM CHLORIDE 0.9 % (FLUSH) 0.9 %
5-40 SYRINGE (ML) INJECTION EVERY 12 HOURS SCHEDULED
Status: DISCONTINUED | OUTPATIENT
Start: 2025-07-20 | End: 2025-07-23 | Stop reason: HOSPADM

## 2025-07-20 RX ORDER — POTASSIUM CHLORIDE 1500 MG/1
40 TABLET, EXTENDED RELEASE ORAL PRN
Status: DISCONTINUED | OUTPATIENT
Start: 2025-07-20 | End: 2025-07-23 | Stop reason: HOSPADM

## 2025-07-20 RX ORDER — ACETAMINOPHEN 650 MG/1
650 SUPPOSITORY RECTAL EVERY 6 HOURS PRN
Status: DISCONTINUED | OUTPATIENT
Start: 2025-07-20 | End: 2025-07-23 | Stop reason: HOSPADM

## 2025-07-20 RX ORDER — LORAZEPAM 2 MG/ML
4 INJECTION INTRAMUSCULAR
Status: DISCONTINUED | OUTPATIENT
Start: 2025-07-20 | End: 2025-07-20

## 2025-07-20 RX ORDER — LORAZEPAM 2 MG/1
4 TABLET ORAL
Status: DISCONTINUED | OUTPATIENT
Start: 2025-07-20 | End: 2025-07-20

## 2025-07-20 RX ORDER — GADOTERIDOL 279.3 MG/ML
10 INJECTION INTRAVENOUS
Status: COMPLETED | OUTPATIENT
Start: 2025-07-20 | End: 2025-07-20

## 2025-07-20 RX ORDER — LANOLIN ALCOHOL/MO/W.PET/CERES
100 CREAM (GRAM) TOPICAL DAILY
Status: DISCONTINUED | OUTPATIENT
Start: 2025-07-20 | End: 2025-07-20

## 2025-07-20 RX ORDER — LORAZEPAM 2 MG/ML
3 INJECTION INTRAMUSCULAR
Status: DISCONTINUED | OUTPATIENT
Start: 2025-07-20 | End: 2025-07-20

## 2025-07-20 RX ORDER — ACETAMINOPHEN 325 MG/1
650 TABLET ORAL EVERY 6 HOURS PRN
Status: DISCONTINUED | OUTPATIENT
Start: 2025-07-20 | End: 2025-07-23 | Stop reason: HOSPADM

## 2025-07-20 RX ORDER — LACOSAMIDE 100 MG/1
100 TABLET ORAL 2 TIMES DAILY
Status: DISCONTINUED | OUTPATIENT
Start: 2025-07-20 | End: 2025-07-21

## 2025-07-20 RX ORDER — LORAZEPAM 2 MG/ML
4 INJECTION INTRAMUSCULAR EVERY 5 MIN PRN
Status: DISCONTINUED | OUTPATIENT
Start: 2025-07-20 | End: 2025-07-20

## 2025-07-20 RX ORDER — LORAZEPAM 2 MG/1
2 TABLET ORAL
Status: DISCONTINUED | OUTPATIENT
Start: 2025-07-20 | End: 2025-07-23 | Stop reason: HOSPADM

## 2025-07-20 RX ORDER — LORAZEPAM 2 MG/ML
1 INJECTION INTRAMUSCULAR EVERY 5 MIN PRN
Status: DISCONTINUED | OUTPATIENT
Start: 2025-07-20 | End: 2025-07-20 | Stop reason: HOSPADM

## 2025-07-20 RX ORDER — LORAZEPAM 2 MG/ML
1 INJECTION INTRAMUSCULAR
Status: DISCONTINUED | OUTPATIENT
Start: 2025-07-20 | End: 2025-07-23 | Stop reason: HOSPADM

## 2025-07-20 RX ADMIN — SODIUM CHLORIDE, PRESERVATIVE FREE 10 ML: 5 INJECTION INTRAVENOUS at 19:37

## 2025-07-20 RX ADMIN — THERA TABS 1 TABLET: TAB at 07:49

## 2025-07-20 RX ADMIN — ENOXAPARIN SODIUM 40 MG: 100 INJECTION SUBCUTANEOUS at 08:59

## 2025-07-20 RX ADMIN — LAMOTRIGINE 50 MG: 25 TABLET ORAL at 08:59

## 2025-07-20 RX ADMIN — SODIUM CHLORIDE, PRESERVATIVE FREE 10 ML: 5 INJECTION INTRAVENOUS at 07:49

## 2025-07-20 RX ADMIN — GADOTERIDOL 10 ML: 279.3 INJECTION, SOLUTION INTRAVENOUS at 01:18

## 2025-07-20 RX ADMIN — BRIVARACETAM 100 MG: 50 TABLET, FILM COATED ORAL at 07:48

## 2025-07-20 RX ADMIN — Medication 1 MG: at 00:01

## 2025-07-20 RX ADMIN — Medication 100 MG: at 07:49

## 2025-07-20 RX ADMIN — ACETAMINOPHEN 650 MG: 325 TABLET ORAL at 16:51

## 2025-07-20 RX ADMIN — LACOSAMIDE 100 MG: 100 TABLET, FILM COATED ORAL at 19:37

## 2025-07-20 ASSESSMENT — PAIN DESCRIPTION - LOCATION: LOCATION: HEAD

## 2025-07-20 ASSESSMENT — PAIN SCALES - GENERAL
PAINLEVEL_OUTOF10: 7
PAINLEVEL_OUTOF10: 3

## 2025-07-20 ASSESSMENT — PAIN DESCRIPTION - DESCRIPTORS: DESCRIPTORS: ACHING;POUNDING

## 2025-07-20 NOTE — CARE COORDINATION
07/20/25 1558   Readmission Assessment   Number of Days since last admission? 1-7 days  (No RAC, transfer from [facility])   Previous Disposition Other (comment)  (No RAC, transfer from [facility])   Who is being Interviewed Caregiver  (No RAC, transfer from [facility])   What was the patient's/caregiver's perception as to why they think they needed to return back to the hospital? Other (Comment)  (No RAC, transfer from [facility])   Did you visit your Primary Care Physician after you left the hospital, before you returned this time? No   Why weren't you able to visit your PCP? Other (Comment)  (No RAC, transfer from [facility])   Did you see a specialist, such as Cardiac, Pulmonary, Orthopedic Physician, etc. after you left the hospital? Other (Comment)  (No RAC, transfer from [facility])   Who advised the patient to return to the hospital? Physician   Does the patient report anything that got in the way of taking their medications? No  (No RAC, transfer from [facility])   In our efforts to provide the best possible care to you and others like you, can you think of anything that we could have done to help you after you left the hospital the first time, so that you might not have needed to return so soon? Other (Comment)  (No RAC, transfer from [facility])

## 2025-07-20 NOTE — PROGRESS NOTES
2030- Pt requested to use restroom. Writer at bedside. Pt able to sit up and dangle feet but upon standing states, \"I can't feel my lower body.\" Pt attempted to put weight on BLE and legs gave out. Pt given walker and unable to ambulate also. Pt able to pivot with staff assist x2 and urinate.     2100- New IV placed in RFA, IV in L hand removed. Pt back to bed and c/o numb/cold BLE. Pt able to identify what leg is being touched when eyes closed. Pedal pulses +1. Pt starts complaining of anxiety (aura) and states, \"This is when it starts to happen.\" Writer exited room to retrieve ativan PRN.    2104- Pt turned on R side and exhibiting shaking/drooling. Limbs able to be manipulated.  O2 88% on RA /69. Nystagmus noted as well as drooling. Seizure lasted 45 sec-1 minute. PRN ativan given.    2105- HR 76 O2 98% on RA /56. Pt A/Ox4 but lethargic and delayed to respond.   Neuro notified, Dr Hopkins ordered 200mg of briviact loading dose now and 100mg BID (6 hrs from loading dose.) Orders given for status change to ICU, CIWA scale, and EEG first thing 0700 if possible.     2300- Neuro updated with pt status- lethargic, numbness/tingling to lower extremities- unable to bear weight. STAT lumbar MRI w contrast to r/o spinal cord compression. MRI screening completed.     2359: pt having seizure activity-  low amplitude jerking of BUE. Neuro aware that ativan PRN Q4 is not due and orders for ativan 1mg IVP Q5 while seizing. Ativan IVP therapeutic.     0005: Pt transported to MRI via bed. Pt lethargic but agreeable. Writer messaged neuro to see if MRI brain was needed- Dr. Hopkins ok with ordering MRI brain w/wo. Tolerated MRI fairly well.     0030- Orders from neuro for transfer to [facility] neuro ICU for \"need an EEG to be hooked on or a ceribell which they do at [facility]\"     0035- Alisha Mesa RN, notified of need for transfer per neuro. Notified of seizure precautions, suicide precautions, and need for  telemetry.     0148- Writer spoke with [facility] and medical necessity/face sheet faxed. Pt's mother, Kristan, updated via phone.     9438- Elyo RN given report at bedside and given all pt belongings.

## 2025-07-20 NOTE — PROGRESS NOTES
1530- Psych at bedside, assessing patient. NP states that pt is denying suicidal ideations at this time and as pt was intoxicated at the time of expressing suicidal ideations, she will lift the pink slip.    1536- Dr Rodriguez notified of Psych assessment. Also notified that patient is continuing to c/o weakness in all four extremities, however is only c/o numbness and tingling in the RUE and RLE.     1544- Dr Rodriguez notified pt had seizure-like activity (minor shaking and twitching, as well as eyelid fluttering). Vitals remained stable during. RN attempted to place ammonia wipe under patients nose with no response. Activity lasted approximately 1 minute. Afterward, patient minimally responsive to verbal and noxious stimuli. Once RN left the room, sitter stated that patient opened eyes and asked for water.     1550- Neuro resident at bedside to assess patient. States to keep safety sitter until tomorrow despite psych assessment and to continue to document any seizure-like activity/ movement. Resident aware of numbness/tingling and weakness of extremities.

## 2025-07-20 NOTE — CARE COORDINATION
Case Management Assessment  Initial Evaluation    Date/Time of Evaluation: 7/20/2025 3:54 PM  Assessment Completed by: Georgette Sagastume    If patient is discharged prior to next notation, then this note serves as note for discharge by case management.    Patient Name: Corry Ruiz                   YOB: 2005  Diagnosis: Status epilepticus (HCC) [G40.901]                   Date / Time: 7/20/2025  3:38 AM    Patient Admission Status: Inpatient   Readmission Risk (Low < 19, Mod (19-27), High > 27): Readmission Risk Score: 3.8    Current PCP: Radha Alcocer APRN - CNP  PCP verified by CM? Yes (LAUREANO Alcocer NP)    Chart Reviewed: Yes      History Provided by: Child/Family  Patient Orientation: Other (see comment) (CM spoke to Kristan roy to complete assessment)    Patient Cognition: Other (see comment) (CM spoke to Kristan roy to complete assessment)    Hospitalization in the last 30 days (Readmission):  No- Transfer from [facility]      If yes, Readmission Assessment in CM Navigator will be completed.    Advance Directives:      Code Status: Full Code   Patient's Primary Decision Maker is: Legal Next of Kin      Discharge Planning:    Patient lives with: Family Members, Children Type of Home: House  Primary Care Giver: Self  Patient Support Systems include: Parent, Family Members, Children   Current Financial resources: None  Current community resources: None  Current services prior to admission: None            Current DME:              Type of Home Care services:  None    ADLS  Prior functional level: Independent in ADLs/IADLs  Current functional level: Independent in ADLs/IADLs    PT AM-PAC:   /24  OT AM-PAC:   /24    Family can provide assistance at DC: Yes  Would you like Case Management to discuss the discharge plan with any other family members/significant others, and if so, who? Yes (Mother- Kristan)  Plans to Return to Present Housing: Yes  Other Identified Issues/Barriers to

## 2025-07-20 NOTE — PLAN OF CARE
Problem: Seizure Precautions  Goal: Remains free of injury related to seizures activity  Outcome: Progressing     Problem: Discharge Planning  Goal: Discharge to home or other facility with appropriate resources  Outcome: Progressing  Flowsheets  Taken 7/20/2025 1205  Discharge to home or other facility with appropriate resources:   Identify barriers to discharge with patient and caregiver   Arrange for needed discharge resources and transportation as appropriate   Identify discharge learning needs (meds, wound care, etc)   Refer to discharge planning if patient needs post-hospital services based on physician order or complex needs related to functional status, cognitive ability or social support system  Taken 7/20/2025 0755  Discharge to home or other facility with appropriate resources:   Identify barriers to discharge with patient and caregiver   Arrange for needed discharge resources and transportation as appropriate   Identify discharge learning needs (meds, wound care, etc)   Refer to discharge planning if patient needs post-hospital services based on physician order or complex needs related to functional status, cognitive ability or social support system

## 2025-07-20 NOTE — CARE COORDINATION
Case Management   Daily Progress Note       Patient Name: Corry Ruiz                   YOB: 2005  Diagnosis: Status epilepticus (HCC) [G40.901]                         days  Length of Stay: 0  days    Anticipated Discharge Date: Two or more days before discharge    Readmission Risk (Low < 19, Mod (19-27), High > 27): Readmission Risk Score: 3.8        Current Transitional Plan    [x] Home Independently    [] Home with HC    [] Skilled Nursing Facility    [] Acute Rehabilitation    [] Long Term Acute Care (LTAC)    [] Other:     Plan for the Stay (Medical Management) :    Plan is home, family for transport, HELP to assist with Medicaid Application       Workflow Continuation (Additional Notes) :    1603: CM called and left voicemail for Gareth, agent for Public Benefits to see if they can assist with getting the patient's Medicaid Benefits reinstated.     Georgette Sagastume  July 20, 2025

## 2025-07-20 NOTE — H&P
Neuro ICU History & Physical    Patient Name: Corry Ruiz  Patient : 2005  Room/Bed: 0116/0116-01  Code Status: Full code  Allergies: Not on File    CHIEF COMPLAINT     Altered mental status and seizure-like activity    HPI    History Obtained From: Chart review    The patient is a 20 y.o. female who originally presented to outside hospital with concern for altered mental status and seizure-like activity on .  Per chart review, patient was brought in by EMS after they were called to a local hotel due to the patient having seizure-like activity.  Allegedly patient has history of seizures when consuming large quantities of alcohol and was consuming alcohol at that time. Ethanol level 128. Patient's mother was contacted and provided further history, stated patient has been hospitalized 4-5 times, most recently in January of this year, after binge drinking with seizure-like activity.  Patient also has a lengthy psychiatric history and previous suicide attempt via Zoloft overdose and she has been pink slipped and placed on suicide precautions at outside hospital for suicidal ideation. Per chart review does not appear the patient is on any antiepileptic medications.  She also denies being on any seizure meds and also denies being on any psych meds at this time.  Appears there has been some concern that these may be functional seizures or psychogenic seizures.  Patient was loaded with Briviact 200 mg per Dr. Hopkins (Neurology) who also advised this medication 100 mg twice daily.  He recommended loading with Depakote 30 mg/kg and starting Depakote 500 mg twice daily if her seizures continued.  Patient also received several doses of Ativan at outside hospital.    At outside facility, patient was also complaining of bilateral lower extremity numbness and weakness.  An MRI of the lumbar spine was obtained which is negative. On arrival to the unit, patient also reported to me that she cannot feel or move her

## 2025-07-20 NOTE — PLAN OF CARE
Patient with multiple episodes of seizure like activity, responded to ammonia.  Briviact dced. Will not start depakote given childbearing age.   Lamictal 50 mg BID started.  Discussed with Dr. Wiley, attending.   Okay to transfer to [facility].      Electronically signed by BRANDON Jean MD on 7/20/2025 at 8:32 AM      n/a

## 2025-07-20 NOTE — CONSULTS
[facility] [place]  [address], [address]  [place] [address]  Ph: [phone] or [phone]  FAX: [phone]    Chief Complaint: Seizures     I had the pleasure of seeing your patient today in neurology consultation for her symptoms. As you would recall Corry Ruiz is a 20 y.o. female with altered mental status, suicidal ideation and seizures    EMS was called to a local hotel when patient's friend stated that she was having seizures.  History was limited.  After the patient was awake she stated that she has a history of seizures especially when drinking.  She mentioned that she was drinking due to her depression and has been drinking and attempted self-harm.  The ER attending spoke with the patient's mother a few times who stated that she was admitted to the hospital for other times due to alcohol-related seizures.    In the ER she had intermittent questionable seizure-like activity in the ER attendings were concerned in case if it was seizures or pseudoseizures.  At times she was arousable and would respond and would have nonrhythmic movements in the upper or lower extremities.  In the ER she stated that she wanted to leave and is no longer suicidal.  And was questioning the staff about what happens if I just leave.?    In the ER and the floor she had excessive shortness of breath with chest tightness, episodes of tachypnea and tachycardia and then closes eyes and low frequency, low-amplitude jerking of the bilateral lower extremities and would not speak during this time.  This would last for approximately 45 seconds immediately after the episode the patient would open the eyes to calling her name and tachycardia and tachypnea would resolve.    She complained of bilateral lower extremities numbness and tingling which also progressed to her hands and lips.  Complained of bilateral lower extremity weakness and mentions that she cannot feel her lower body and that the legs give  injection 3 mg  3 mg IntraVENous Q1H PRN Jean Hopkins MD        Or    LORazepam (ATIVAN) tablet 4 mg  4 mg Oral Q1H PRN Jean Hopkins MD        Or    LORazepam (ATIVAN) injection 4 mg  4 mg IntraVENous Q1H PRN Jean Hopkins MD           General appearence: Normal. Well groomed. In no acute distress    Head: Normocephalic, atraumatic  Eyes: Extraocular movements intact, eye lids normal  Extremities: No cyanosis or edema, 2+ pulses  Musculoskeletal: Normal range of motion in all joints  Skin: Intact, normal skin color    Neurological examination:    Mental status   Alert and does not speak and chooses not to follow commands closes her eyes during conversation.     Cranial nerves   II - visual fields intact to confrontation                                                III, IV, VI - extra-ocular muscles full: no pupillary defect; no STEWART, no nystagmus, no ptosis   V - normal facial sensation                                                               VII - normal facial symmetry                                                             VIII - intact hearing                                                                                  Motor function  Normal muscle bulk and tone; does not interact with strength testing but moves the bilateral lower extremities 4/5 with pain stimulus     Sensory function Intact to touch, pin,     Cerebellar Unable to assess     Reflex function Intact 2+ DTR and symmetric. Negative Babinski     Gait                  Bedbound       No results found for: \"LDLDIRECT\"  No components found for: \"CHLPL\"  No results found for: \"TRIG\"  No results found for: \"HDL\"  No components found for: \"LDLCALC\"  No components found for: \"LABVLDL\"  No results found for: \"LABA1C\"  No results found for: \"EAG\"  No results found for: \"NDTBJKNA74\"  No results found for: \"FOLATE\"   Neurological work up:  CT head  CTA head and neck  MRI brain   2 D echo     No image results found.     All of patient's

## 2025-07-20 NOTE — PROCEDURES
VIDEO-EEG REPORT            EEG Service Date: 07/20/25        Date of Report: 7/20/2025     History: Corry Ruiz is a 20 y.o. female with a history of altered mental status who is undergoing an EEG to evaluate for seizures.      Medications:   Current Facility-Administered Medications   Medication Dose Route Frequency Provider Last Rate Last Admin    sodium chloride flush 0.9 % injection 5-40 mL  5-40 mL IntraVENous 2 times per day Nadege Champion, DO        sodium chloride flush 0.9 % injection 5-40 mL  5-40 mL IntraVENous PRN sunita Nadege, DO        0.9 % sodium chloride infusion   IntraVENous PRN sunita Nadege,         potassium chloride (KLOR-CON M) extended release tablet 40 mEq  40 mEq Oral PRN sunita NadegeDO        Or    potassium bicarb-citric acid (EFFER-K) effervescent tablet 40 mEq  40 mEq Oral PRN [facility] NadegeDO        Or    potassium chloride 10 mEq/100 mL IVPB (Peripheral Line)  10 mEq IntraVENous PRN sunita Nadege, DO        magnesium sulfate 2000 mg in 50 mL IVPB premix  2,000 mg IntraVENous PRN sunita Nadege, DO        ondansetron (ZOFRAN-ODT) disintegrating tablet 4 mg  4 mg Oral Q8H PRN sunita NadegeDO        Or    ondansetron (ZOFRAN) injection 4 mg  4 mg IntraVENous Q6H PRN sunita Nadege DO        polyethylene glycol (GLYCOLAX) packet 17 g  17 g Oral Daily PRN Akira Nadege, DO        acetaminophen (TYLENOL) tablet 650 mg  650 mg Oral Q6H PRN sunita Nadege, DO   650 mg at 07/20/25 1651    Or    acetaminophen (TYLENOL) suppository 650 mg  650 mg Rectal Q6H PRN sunita Nadege, DO        multivitamin 1 tablet  1 tablet Oral Daily Nadege Champion DO   1 tablet at 07/20/25 0749    sodium chloride flush 0.9 % injection 5-40 mL  5-40 mL IntraVENous 2 times per day Nadege Champion, DO   10 mL at 07/20/25 0749    sodium chloride flush 0.9 % injection 5-40 mL  5-40 mL IntraVENous PRN Nadege Champion, DO        0.9 % sodium chloride infusion   IntraVENous PRN Nadege Champion, DO

## 2025-07-20 NOTE — CONSULTS
Department of Psychiatry  Consult Service   Psychiatric Assessment        REASON FOR CONSULT: Suicidal ideation    CONSULTING PHYSICIAN: MARILOU Baig    History obtained from: Patient, EMR, RN    HISTORY OF PRESENT ILLNESS:          The patient is a 20 y.o. female with significant psychiatric history of major depression who is admitted medically for status epilepticus.  Patient was admitted on 7/19/2025 for intermittent seizure-like activity.  There was a question whether it was true seizures versus pseudoseizures.  Patient was treated with Versed.  She was found to have elevated ethanol levels.  Once clinically sober she expressed thoughts of suicidal ideation with plans to drink herself to death.  Patient did confirm that she had thoughts of wanting to longer be alive.  Previous admission to adolescent psychiatry at age 15 after overdose on Zoloft.    Patient was transferred to [facility] for further EEG and neuro workup.  Possible seizures or new onset.  Patient currently alert and oriented x 4.  She does appear dysthymic and lethargic.  She is adamantly denying any suicidal ideation.  When asked if she was feeling suicidal she states \"I was feeling that way when I was drinking but I am no longer feeling that way.  She states \"I do get like that when I drink sometimes.\"  It is noted per previous documentation that the patient's mother mentioned when the patient drinks excessively she does become suicidal since age 15.  Patient states \"I have sobered up and I no longer feel that way.\"  She is denying any problems with sleep.  Does endorse some anhedonia, low energy, difficulty with concentrating.  Denies any hopelessness, guilt, or feelings of worthlessness.  She does endorse anxiety, restless, nervousness, and fatigue.  Endorses a history of panic attacks with chest pressure, shortness of breath, and palpitations.  She denies any history of going days without sleep, impaired judgment, racing thoughts, risky  FLULAVAL 0.5ml administered to LD IM, VIS given to patient, Patient tolerated vaccine well

## 2025-07-20 NOTE — H&P
[facility]   IN-PATIENT SERVICE   [facility]    HISTORY AND PHYSICAL EXAMINATION            Date:   7/20/2025  Patient name:  Corry Ruiz  Date of admission:  7/20/2025  3:38 AM  MRN:   [id number]  Account:  [id number]  YOB: 2005  PCP:    Radha Alcocer APRN - CNP  Room:   [address] [place] [address]  Code Status:    Full Code    Chief Complaint:     Seizure activity w/ concern for PNES   History of SI       History Obtained From:     patient, electronic medical record    History of Present Illness:     History Obtained From: Chart review     The patient is a 20 y.o. female who originally presented to outside hospital with concern for altered mental status and seizure-like activity on 7/19.  Per chart review, patient was brought in by EMS after they were called to a local hotel due to the patient having seizure-like activity.  Allegedly patient has history of seizures when consuming large quantities of alcohol and was consuming alcohol at that time. Ethanol level 128. Patient's mother was contacted and provided further history, stated patient has been hospitalized 4-5 times, most recently in January of this year, after binge drinking with seizure-like activity.  Patient also has a lengthy psychiatric history and previous suicide attempt via Zoloft overdose and she has been pink slipped and placed on suicide precautions at outside hospital for suicidal ideation. Per chart review does not appear the patient is on any antiepileptic medications.  She also denies being on any seizure meds and also denies being on any psych meds at this time.  Appears there has been some concern that these may be functional seizures or psychogenic seizures.  Patient was loaded with Briviact 200 mg per Dr. Hopkins (Neurology) who also advised this medication 100 mg twice daily.  He recommended loading with Depakote 30 mg/kg and starting Depakote 500 mg twice daily if her seizures continued.  Patient

## 2025-07-20 NOTE — PROGRESS NOTES
Pt transported to EEG via stretcher. Accompanied by safety sitter.     Pt's mom called unit and update was given.

## 2025-07-20 NOTE — PLAN OF CARE
Problem: Discharge Planning  Goal: Discharge to home or other facility with appropriate resources  7/19/2025 2038 by Amita Boo RN  Outcome: Progressing  7/19/2025 2038 by Amita Boo RN  Outcome: Progressing     Problem: Seizure Precautions  Goal: Remains free of injury related to seizures activity  7/19/2025 2038 by Amita Boo RN  Outcome: Progressing  7/19/2025 2038 by Amita Boo RN  Outcome: Progressing     Problem: Safety - Adult  Goal: Free from fall injury  7/19/2025 2038 by Amita Boo RN  Outcome: Progressing  7/19/2025 2038 by Amita Boo RN  Outcome: Progressing     Problem: Self Harm/Suicidality  Goal: Will have no self-injury during hospital stay  Description: INTERVENTIONS:  1.  Ensure constant observer at bedside with Q15M safety checks  2.  Maintain a safe environment  3.  Secure patient belongings  4.  Ensure family/visitors adhere to safety recommendations  5.  Ensure safety tray has been added to patient's diet order  6.  Every shift and PRN: Re-assess suicidal risk via Frequent Screener    Outcome: Progressing

## 2025-07-20 NOTE — PROGRESS NOTES
Shift Handoff Suicide Safety  Nurse reporting off:  Nurse receiving report:      [x] C-SSRS frequent screening flow sheet completed    [x] Care plan Self harm/Suicidality documented for shift    [x] Hourly rounding by RN documented    [x] RN checked constant observer flow sheet for Q15 minute documentation.    [x] Reinforced suicide precautions with patient.    [x] Reinforced that visitors will be screened and may be limited at the discretion of the nurse.  Visitor belongings are subject to be searched.  Belongings may not be allowed into the patient room.    [x] Room screened for safety, items removed to create a safe environment include:     [x] Utensils from tray     [x] Extra Supplies      [x] Extra Linen     [x] Personal items     [x] Pt in a safety gown       [x] Safety tray ordered: yes      [x] Expectations were discussed with sitter.  Sitter positioned near exit within 4 to 7 feet from patient(per policy).  Sitter reminded that patient should be observed at all times including toileting and bathing.  Sitter made aware that patients head is to be visible at all times(to mitigate the risk of patient using monitor cords to cause harm to self). Sitter has constant observer flow sheet.- Per Le alonso, she did nit have access to epic flowsheet, RN House Supervisor notified.     Patient and sitter included in hourly rounds.

## 2025-07-20 NOTE — PROGRESS NOTES
This RN was charting the patient's admission assessment and witnessed the patient open her eyes and look at me before starting to shake. She opened her eyes and lifted her head intermittently to look at me. Vitals remained stable. Resident called to bedside. Resident placed an ammonia wipe under the patient's nose, the patient stopped shaking and she sat straight up in the bed. Vitals remained stable. Patient tearful for several minutes afterward.    Patient is agitated that she is ordered to be on bedrest and NPO at this time. Education given to patient as to why she is ordered to be on bedrest based on her statements that her legs are numb and she can't move them, and that she is NPO to prevent choking during one of her episodes. Patient becomes verbal and states \"this is fucking bullshit\" in regard to these orders.    I introduced the bedside  to the patient upon the 's arrival to the room, and when I explained that she would need someone present in the room with her at all times until she is cleared by psych, the patient threw her arms up in the air, covered herself completely with her blankets and stated \"this is more bullshit\"

## 2025-07-21 ENCOUNTER — HOSPITAL ENCOUNTER (OUTPATIENT)
Dept: PSYCHIATRY | Age: 20
Discharge: HOME | End: 2025-07-23

## 2025-07-21 LAB
ANION GAP SERPL CALCULATED.3IONS-SCNC: 11 MMOL/L (ref 9–16)
BASOPHILS # BLD: 0.04 K/UL (ref 0–0.2)
BASOPHILS NFR BLD: 0 % (ref 0–2)
BUN SERPL-MCNC: 11 MG/DL (ref 6–20)
CALCIUM SERPL-MCNC: 9.2 MG/DL (ref 8.6–10.4)
CHLORIDE SERPL-SCNC: 104 MMOL/L (ref 98–107)
CO2 SERPL-SCNC: 21 MMOL/L (ref 20–31)
CREAT SERPL-MCNC: 0.7 MG/DL (ref 0.6–0.9)
EKG ATRIAL RATE: 71 BPM
EKG P AXIS: 31 DEGREES
EKG P-R INTERVAL: 118 MS
EKG Q-T INTERVAL: 412 MS
EKG QRS DURATION: 98 MS
EKG QTC CALCULATION (BAZETT): 447 MS
EKG R AXIS: 40 DEGREES
EKG T AXIS: 43 DEGREES
EKG VENTRICULAR RATE: 71 BPM
EOSINOPHIL # BLD: 0.09 K/UL (ref 0–0.44)
EOSINOPHILS RELATIVE PERCENT: 1 % (ref 1–4)
ERYTHROCYTE [DISTWIDTH] IN BLOOD BY AUTOMATED COUNT: 12.1 % (ref 11.8–14.4)
GFR, ESTIMATED: >90 ML/MIN/1.73M2
GLUCOSE SERPL-MCNC: 75 MG/DL (ref 74–99)
HCT VFR BLD AUTO: 39.5 % (ref 36.3–47.1)
HGB BLD-MCNC: 12.8 G/DL (ref 11.9–15.1)
IMM GRANULOCYTES # BLD AUTO: 0.05 K/UL (ref 0–0.3)
IMM GRANULOCYTES NFR BLD: 0 %
LYMPHOCYTES NFR BLD: 2.57 K/UL (ref 1.1–3.7)
LYMPHOCYTES RELATIVE PERCENT: 22 % (ref 24–43)
MCH RBC QN AUTO: 29.6 PG (ref 25.2–33.5)
MCHC RBC AUTO-ENTMCNC: 32.4 G/DL (ref 28.4–34.8)
MCV RBC AUTO: 91.2 FL (ref 82.6–102.9)
MONOCYTES NFR BLD: 0.83 K/UL (ref 0.1–1.2)
MONOCYTES NFR BLD: 7 % (ref 3–12)
NEUTROPHILS NFR BLD: 70 % (ref 36–65)
NEUTS SEG NFR BLD: 8.19 K/UL (ref 1.5–8.1)
NRBC BLD-RTO: 0 PER 100 WBC
PLATELET # BLD AUTO: 324 K/UL (ref 138–453)
PMV BLD AUTO: 10.1 FL (ref 8.1–13.5)
POTASSIUM SERPL-SCNC: 3.5 MMOL/L (ref 3.7–5.3)
RBC # BLD AUTO: 4.33 M/UL (ref 3.95–5.11)
SODIUM SERPL-SCNC: 136 MMOL/L (ref 136–145)
WBC OTHER # BLD: 11.8 K/UL (ref 3.5–11.3)

## 2025-07-21 PROCEDURE — 2500000003 HC RX 250 WO HCPCS

## 2025-07-21 PROCEDURE — 85025 COMPLETE CBC W/AUTO DIFF WBC: CPT

## 2025-07-21 PROCEDURE — 36415 COLL VENOUS BLD VENIPUNCTURE: CPT

## 2025-07-21 PROCEDURE — 6370000000 HC RX 637 (ALT 250 FOR IP)

## 2025-07-21 PROCEDURE — 80048 BASIC METABOLIC PNL TOTAL CA: CPT

## 2025-07-21 PROCEDURE — 95714 VEEG EA 12-26 HR UNMNTR: CPT

## 2025-07-21 PROCEDURE — 6360000002 HC RX W HCPCS

## 2025-07-21 PROCEDURE — 99232 SBSQ HOSP IP/OBS MODERATE 35: CPT | Performed by: PSYCHIATRY & NEUROLOGY

## 2025-07-21 PROCEDURE — 93010 ELECTROCARDIOGRAM REPORT: CPT | Performed by: INTERNAL MEDICINE

## 2025-07-21 PROCEDURE — 6370000000 HC RX 637 (ALT 250 FOR IP): Performed by: STUDENT IN AN ORGANIZED HEALTH CARE EDUCATION/TRAINING PROGRAM

## 2025-07-21 PROCEDURE — 95720 EEG PHY/QHP EA INCR W/VEEG: CPT | Performed by: STUDENT IN AN ORGANIZED HEALTH CARE EDUCATION/TRAINING PROGRAM

## 2025-07-21 PROCEDURE — 2060000000 HC ICU INTERMEDIATE R&B

## 2025-07-21 RX ORDER — FOLIC ACID 1 MG/1
1 TABLET ORAL DAILY
Status: DISCONTINUED | OUTPATIENT
Start: 2025-07-21 | End: 2025-07-23 | Stop reason: HOSPADM

## 2025-07-21 RX ORDER — DIPHENHYDRAMINE HYDROCHLORIDE 50 MG/ML
25 INJECTION, SOLUTION INTRAMUSCULAR; INTRAVENOUS
Status: COMPLETED | OUTPATIENT
Start: 2025-07-21 | End: 2025-07-22

## 2025-07-21 RX ORDER — LAMOTRIGINE 25 MG/1
50 TABLET ORAL 2 TIMES DAILY
Status: DISCONTINUED | OUTPATIENT
Start: 2025-07-21 | End: 2025-07-23 | Stop reason: HOSPADM

## 2025-07-21 RX ADMIN — ACETAMINOPHEN 650 MG: 325 TABLET ORAL at 04:29

## 2025-07-21 RX ADMIN — THERA TABS 1 TABLET: TAB at 09:21

## 2025-07-21 RX ADMIN — SODIUM CHLORIDE, PRESERVATIVE FREE 10 ML: 5 INJECTION INTRAVENOUS at 20:17

## 2025-07-21 RX ADMIN — SODIUM CHLORIDE, PRESERVATIVE FREE 10 ML: 5 INJECTION INTRAVENOUS at 09:21

## 2025-07-21 RX ADMIN — LACOSAMIDE 100 MG: 100 TABLET, FILM COATED ORAL at 09:21

## 2025-07-21 RX ADMIN — ACETAMINOPHEN 650 MG: 325 TABLET ORAL at 09:20

## 2025-07-21 RX ADMIN — LAMOTRIGINE 50 MG: 25 TABLET ORAL at 11:22

## 2025-07-21 RX ADMIN — ACETAMINOPHEN 650 MG: 325 TABLET ORAL at 16:37

## 2025-07-21 RX ADMIN — FOLIC ACID 1 MG: 1 TABLET ORAL at 14:50

## 2025-07-21 RX ADMIN — ACETAMINOPHEN 650 MG: 325 TABLET ORAL at 23:04

## 2025-07-21 RX ADMIN — LAMOTRIGINE 50 MG: 25 TABLET ORAL at 20:15

## 2025-07-21 RX ADMIN — POTASSIUM BICARBONATE 20 MEQ: 782 TABLET, EFFERVESCENT ORAL at 09:21

## 2025-07-21 RX ADMIN — SODIUM CHLORIDE, PRESERVATIVE FREE 10 ML: 5 INJECTION INTRAVENOUS at 09:20

## 2025-07-21 RX ADMIN — Medication 100 MG: at 09:21

## 2025-07-21 RX ADMIN — ENOXAPARIN SODIUM 40 MG: 100 INJECTION SUBCUTANEOUS at 09:20

## 2025-07-21 ASSESSMENT — ENCOUNTER SYMPTOMS
SHORTNESS OF BREATH: 0
CHEST TIGHTNESS: 0
ABDOMINAL PAIN: 0

## 2025-07-21 ASSESSMENT — PAIN SCALES - GENERAL
PAINLEVEL_OUTOF10: 6
PAINLEVEL_OUTOF10: 3
PAINLEVEL_OUTOF10: 3
PAINLEVEL_OUTOF10: 0
PAINLEVEL_OUTOF10: 6

## 2025-07-21 ASSESSMENT — PAIN DESCRIPTION - LOCATION
LOCATION: HEAD
LOCATION: LEG;HEAD
LOCATION: HEAD
LOCATION: HEAD

## 2025-07-21 ASSESSMENT — PAIN DESCRIPTION - DESCRIPTORS: DESCRIPTORS: POUNDING

## 2025-07-21 NOTE — PROCEDURES
VIDEO-EEG REPORT            EEG Service Date: From 1511 on 07/20/25 to 0700 on 7/21/2025----15 hours 49 minutes        Date of Report: 7/20/2025     History: Corry Ruiz is a 20 y.o. female with a history of altered mental status who is undergoing an EEG to evaluate for seizures.      Medications:   Current Facility-Administered Medications   Medication Dose Route Frequency Provider Last Rate Last Admin    sodium chloride flush 0.9 % injection 5-40 mL  5-40 mL IntraVENous 2 times per day Nadege Champion DO   10 mL at 07/21/25 0921    sodium chloride flush 0.9 % injection 5-40 mL  5-40 mL IntraVENous PRN Nadege Champion DO        0.9 % sodium chloride infusion   IntraVENous PRN Nadege Champion,         potassium chloride (KLOR-CON M) extended release tablet 40 mEq  40 mEq Oral PRN Nadege Champion DO        Or    potassium bicarb-citric acid (EFFER-K) effervescent tablet 40 mEq  40 mEq Oral PRN Nadege Champion DO        Or    potassium chloride 10 mEq/100 mL IVPB (Peripheral Line)  10 mEq IntraVENous PRN Nadege Champion,         magnesium sulfate 2000 mg in 50 mL IVPB premix  2,000 mg IntraVENous PRN Nadege Champion,         ondansetron (ZOFRAN-ODT) disintegrating tablet 4 mg  4 mg Oral Q8H PRN Nadege Champion DO        Or    ondansetron (ZOFRAN) injection 4 mg  4 mg IntraVENous Q6H PRN Nadege Champion, DO        polyethylene glycol (GLYCOLAX) packet 17 g  17 g Oral Daily PRN Nadege Champion DO        acetaminophen (TYLENOL) tablet 650 mg  650 mg Oral Q6H PRN Nadege Champion DO   650 mg at 07/21/25 0920    Or    acetaminophen (TYLENOL) suppository 650 mg  650 mg Rectal Q6H PRN Nadege Champion, DO        multivitamin 1 tablet  1 tablet Oral Daily Nadege Champion DO   1 tablet at 07/21/25 0921    sodium chloride flush 0.9 % injection 5-40 mL  5-40 mL IntraVENous 2 times per day Nadege Champion DO   10 mL at 07/21/25 0920    sodium chloride flush 0.9 % injection 5-40 mL  5-40 mL IntraVENous PRN Nadege Champion, DO         0.9 % sodium chloride infusion   IntraVENous PRN Bosunita, Nadege, DO        thiamine tablet 100 mg  100 mg Oral Daily Bour, Nadege, DO   100 mg at 07/21/25 0921    LORazepam (ATIVAN) tablet 1 mg  1 mg Oral Q1H PRN Bour, Nadege, DO        Or    LORazepam (ATIVAN) injection 1 mg  1 mg IntraVENous Q1H PRN Bour, Nadege, DO        Or    LORazepam (ATIVAN) tablet 2 mg  2 mg Oral Q1H PRN Bour, Nadege, DO        Or    LORazepam (ATIVAN) injection 2 mg  2 mg IntraVENous Q1H PRN Bour, Nadege, DO        enoxaparin (LOVENOX) injection 40 mg  40 mg SubCUTAneous Daily SenaHenri saeedEDWARDO - CNP   40 mg at 07/21/25 0920    lacosamide (VIMPAT) tablet 100 mg  100 mg Oral BID MoralesClivet A, DO   100 mg at 07/21/25 0921             Level of Consciousness: Awake, asleep     Duration of Study: 1 hour 5 minutes     Report: This EEG was acquired with electrodes placed according to the 10-20 electrode placement system. A single EKG channel was recorded for cardiac rhythm monitoring. Video was recorded simultaneously. Quality of recording was fair.    Occipital dominant rhythm was seen as 9 hz and had amplitude of 20-30 µV. This activity was sinusoidal in morphology, and was  and reactive to eye closure. No focal slowing or asymmetry was noted. Drowsiness and sleep were manifested by disappearance of eye blinks and EMG artifact, drop-off in PDR, appearance of vertex sharp waves, sleep spindles, K-complexes, POSTS, consistent with stages N1 and N2 of non-REM sleep.       Photic stimulation and hyperventilation were not performed.      Interictal epileptiform abnormalities: None     Ictal Events: None     Clinical Events: None     EEG  Classification: This vEEG was obtained while awake and asleep and is normal.     Clinical Correlation: This is a normal video EEG recording.  No epileptiform discharges or lateralizing signs were seen.  Absence of epileptiform discharges does not exclude the diagnosis of epilepsy.              Sheila Sánchez,

## 2025-07-21 NOTE — PROGRESS NOTES
Spiritual Health History and Assessment/Progress Note  [facility]    Initial Encounter,  ,  ,      Name: Corry Ruiz MRN: [id number]    Age: 20 y.o.     Sex: female   Language: English   Denominational: None   Status epilepticus (HCC)     Date: 7/20/2025            Total Time Calculated: 10 min              Spiritual Assessment began in [facility] ICU        Referral/Consult From: Rounding   Encounter Overview/Reason: Initial Encounter  Service Provided For: Patient    Upon entering room, pt was lying in bed in darkened room, sitter was observing pt. Writer introduced self and title as  and asked if pt would like a visit. Pt nodded yes.  and pt engaged in a brief conversation. Pt stated that she goes by the name of Antonella and that she has a 2 year old daughter. Pt became tearful when talking about her daughter. Pt misses her since she has been in the hospital for 2 days. Pt's support system consists of her mother and sister. Nursing came in so visit ended but  asked pt if she would like a return visit. The pt nodded yes.     Jackelin, Belief, Meaning:   Patient unable to assess at this time  Family/Friends No family/friends present      Importance and Influence:  Patient unable to assess at this time  Family/Friends No family/friends present    Community:  Patient feels well-supported. Support system includes: Parent/s and Children  Family/Friends No family/friends present    Assessment and Plan of Care:     Patient Interventions include: Facilitated expression of thoughts and feelings and Affirmed coping skills/support systems  Family/Friends Interventions include: No family/friends present    Patient Plan of Care: Spiritual Care available upon further referral  Family/Friends Plan of Care: No family/friends present    Electronically signed by Chaplain Indy Intern on 7/20/2025 at 8:38 PM

## 2025-07-21 NOTE — PLAN OF CARE
Problem: Seizure Precautions  Goal: Remains free of injury related to seizures activity  Outcome: Progressing     Problem: Discharge Planning  Goal: Discharge to home or other facility with appropriate resources  Outcome: Progressing  Flowsheets (Taken 7/21/2025 0800)  Discharge to home or other facility with appropriate resources: Identify barriers to discharge with patient and caregiver     Problem: Pain  Goal: Verbalizes/displays adequate comfort level or baseline comfort level  Outcome: Progressing
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
## 2025-07-21 NOTE — PROGRESS NOTES
Spiritual Health History and Assessment/Progress Note  [facility]    Initial Encounter,  ,  ,      Name: Corry Ruiz MRN: [id number]    Age: 20 y.o.     Sex: female   Language: English   Anabaptism: None   Status epilepticus (HCC)     Date: 7/23/2025            Total Time Calculated: 10 min              Spiritual Assessment began in [facility]        Referral/Consult From: Rounding   Encounter Overview/Reason: Initial Encounter  Service Provided For: Patient    Jackelin, Belief, Meaning:   Patient unable to assess at this time  Family/Friends No family/friends present      Importance and Influence:  Patient unable to assess at this time  Family/Friends No family/friends present    Community:  Patient Other: unknown   Family/Friends No family/friends present    Assessment and Plan of Care:     Patient Interventions include: Other: unknown  Family/Friends Interventions include: No family/friends present    Patient Plan of Care: Other: unknown  Family/Friends Plan of Care: No family/friends present    Electronically signed by Willow Vogt  Intern on 7/23/2025 at 4:56 PM

## 2025-07-21 NOTE — PROGRESS NOTES
[facility]   IN-PATIENT SERVICE   [facility]    Progress Note             Date:   7/21/2025  Patient name:  Corry Ruiz  Date of admission:  7/20/2025  3:38 AM  MRN:   [id number]  Account:  [id number]  YOB: 2005  PCP:    Radha Alcocer APRN - CNP  Room:   [address] [place] [address]  Code Status:    Full Code    Chief Complaint:   Seizure activity w/ concern for PNES   History of SI      Interval hx:     The patient was seen and examined at bedside. Is vitally stable, alert and oriented x 3. No acute events overnight.  The patient was sleeping and looked drowsy upon waking her up. She complains of headache, nausea, tingling sensation in her hand and feet, numbness on the left leg, blurry vision since yesterday and tremors. On PE pt has dilated pupils that are reactive and spontaneous jerking movements of her body. The pt is likely withdrawing from alcohol. On CIWA protocol. Pt is on LTME. On Lamictal 50 mg BID. Pt EEG is negative; therefore , no need for Vimpat. UTI treated with rocephin. She denies chest pain, SOB, vomiting, diarrhea.       Brief History of Present Illness:     History Obtained From: Chart review     The patient is a 20 y.o. female who originally presented to outside hospital with concern for altered mental status and seizure-like activity on 7/19.  Per chart review, patient was brought in by EMS after they were called to a local hotel due to the patient having seizure-like activity.  Allegedly patient has history of seizures when consuming large quantities of alcohol and was consuming alcohol at that time. Ethanol level 128. Patient's mother was contacted and provided further history, stated patient has been hospitalized 4-5 times, most recently in January of this year, after binge drinking with seizure-like activity.  Patient also has a lengthy psychiatric history and previous suicide attempt via Zoloft overdose and she has been pink slipped and placed on  Hematocrit 39.5 36.3 - 47.1 %    MCV 91.2 82.6 - 102.9 fL    MCH 29.6 25.2 - 33.5 pg    MCHC 32.4 28.4 - 34.8 g/dL    RDW 12.1 11.8 - 14.4 %    Platelets 324 138 - 453 k/uL    MPV 10.1 8.1 - 13.5 fL    NRBC Automated 0.0 0.0 per 100 WBC    Neutrophils % 70 (H) 36 - 65 %    Lymphocytes % 22 (L) 24 - 43 %    Monocytes % 7 3 - 12 %    Eosinophils % 1 1 - 4 %    Basophils % 0 0 - 2 %    Immature Granulocytes % 0 0 %    Neutrophils Absolute 8.19 (H) 1.50 - 8.10 k/uL    Lymphocytes Absolute 2.57 1.10 - 3.70 k/uL    Monocytes Absolute 0.83 0.10 - 1.20 k/uL    Eosinophils Absolute 0.09 0.00 - 0.44 k/uL    Basophils Absolute 0.04 0.00 - 0.20 k/uL    Immature Granulocytes Absolute 0.05 0.00 - 0.30 k/uL   Basic Metabolic Panel    Collection Time: 07/21/25  5:47 AM   Result Value Ref Range    Sodium 136 136 - 145 mmol/L    Potassium 3.5 (L) 3.7 - 5.3 mmol/L    Chloride 104 98 - 107 mmol/L    CO2 21 20 - 31 mmol/L    Anion Gap 11 9 - 16 mmol/L    Glucose 75 74 - 99 mg/dL    BUN 11 6 - 20 mg/dL    Creatinine 0.7 0.6 - 0.9 mg/dL    Est, Glom Filt Rate >90 >60 mL/min/1.73m2    Calcium 9.2 8.6 - 10.4 mg/dL     Recent Labs     07/21/25  0547   WBC 11.8*   RBC 4.33   HGB 12.8   HCT 39.5   MCV 91.2   MCH 29.6   MCHC 32.4   RDW 12.1      MPV 10.1     Recent Labs     07/21/25  0547      K 3.5*      CO2 21   BUN 11   CREATININE 0.7   GLUCOSE 75   CALCIUM 9.2     No results found for: \"LABA1C\"    Assessment :      Primary Problem  Status epilepticus (HCC)    Active Hospital Problems    Diagnosis Date Noted   • Status epilepticus (HCC) [G40.901] 07/20/2025   • Recurrent seizures (HCC) [G40.909] 07/20/2025   • History of suicidal ideation [Z86.59] 07/20/2025   • Alcohol abuse [F10.10] 07/20/2025   • Mild episode of recurrent major depressive disorder [F33.0] 07/20/2025       This is a 20 y.o. female presents with concerns for seizure-like activity. Reportedly has had multiple admissions for seizure-like activity following
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
## 2025-07-21 NOTE — PROGRESS NOTES
Patient's mother called at this time to inform her that Forensics team was consulted and will be seeing the patient at bedside tonight.

## 2025-07-21 NOTE — PROCEDURES
LONG-TERM EEG-VIDEO MONITORING   CLINICAL NEUROPHYSIOLOGY LABORATORY  DEPARTMENT OF NEUROLOGY  [facility]    Patient: Corry Ruiz  Age: 20 y.o.  MRN: [id number]    Referring Physician: Jean Hopkins MD  History: The patient is a 20 y.o. female who presented breakthrough seizure/encephalopathy. This long-term video-EEG monitoring study was performed to determine the nature of the patient's clinical events. The patient is on neuroactive medications.   Corry Ruiz   Current Facility-Administered Medications   Medication Dose Route Frequency Provider Last Rate Last Admin    lamoTRIgine (LAMICTAL) tablet 50 mg  50 mg Oral BID Ignacio Davis MD   50 mg at 07/21/25 1122    folic acid (FOLVITE) tablet 1 mg  1 mg Oral Daily Latrice Boucher MD   1 mg at 07/21/25 1450    sodium chloride flush 0.9 % injection 5-40 mL  5-40 mL IntraVENous 2 times per day Nadege Champion, DO   10 mL at 07/21/25 0921    sodium chloride flush 0.9 % injection 5-40 mL  5-40 mL IntraVENous PRN Nadege Champion, DO        0.9 % sodium chloride infusion   IntraVENous PRN Nadege Champion, DO        potassium chloride (KLOR-CON M) extended release tablet 40 mEq  40 mEq Oral PRN Nadege Champion, DO        Or    potassium bicarb-citric acid (EFFER-K) effervescent tablet 40 mEq  40 mEq Oral PRN Nadege Champion, DO        Or    potassium chloride 10 mEq/100 mL IVPB (Peripheral Line)  10 mEq IntraVENous PRN Nadege Champion, DO        magnesium sulfate 2000 mg in 50 mL IVPB premix  2,000 mg IntraVENous PRN Nadege Champion, DO        ondansetron (ZOFRAN-ODT) disintegrating tablet 4 mg  4 mg Oral Q8H PRN Nadege Champion, DO        Or    ondansetron (ZOFRAN) injection 4 mg  4 mg IntraVENous Q6H PRN Nadege Champion, DO        polyethylene glycol (GLYCOLAX) packet 17 g  17 g Oral Daily PRN Nadege Champion, DO        acetaminophen (TYLENOL) tablet 650 mg  650 mg Oral Q6H PRN Nadege Champion, DO   650 mg at 07/21/25 1637    Or    acetaminophen (TYLENOL)  seen during periods of REM sleep.  No abnormalities were activated by sleep. The EKG channel revealed no abnormalities.    Ictal EEG Recording / Patient Events: During this period the patient had no events or seizures. Accidental push button were recorded at 16:31, 16:33 and 16:36    Summary: During this day of recording no events were recorded. The interictal EEG was normal. Monitoring was continued in order to record the patient's typical events. The EKG channel revealed no abnormalities.    ROSA HAYES MD  Diplomate, American Board of Psychiatry and Neurology  Diplomate, American Board of Clinical Neurophysiology  Diplomate, American Board of Epilepsy     Please note this is a preliminary report and updated daily. The final report will have a summary of behavior and electrographic findings with clinical correlation.
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
## 2025-07-21 NOTE — PROGRESS NOTES
Patient's bedside sitter called RN into the room because patient was exhibiting seizure-like shaking. Ammonia was placed under patient's nose and she immediately stopped shaking, began coughing, sat straight up in the bed, and turned away from nursing staff. Vital signs unchanged during this episode. Patient layed back, rolled over, and tucked herself under her blankets.
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
## 2025-07-22 ENCOUNTER — HOSPITAL ENCOUNTER (OUTPATIENT)
Dept: PSYCHIATRY | Age: 20
Discharge: HOME | End: 2025-07-24

## 2025-07-22 ENCOUNTER — APPOINTMENT (OUTPATIENT)
Dept: GENERAL RADIOLOGY | Age: 20
DRG: 880 | End: 2025-07-22
Attending: STUDENT IN AN ORGANIZED HEALTH CARE EDUCATION/TRAINING PROGRAM
Payer: MEDICAID

## 2025-07-22 VITALS — HEART RATE: 82 BPM | RESPIRATION RATE: 16 BRPM | DIASTOLIC BLOOD PRESSURE: 55 MMHG | SYSTOLIC BLOOD PRESSURE: 97 MMHG

## 2025-07-22 LAB
ALBUMIN SERPL-MCNC: 4 G/DL (ref 3.5–5.2)
ALBUMIN/GLOB SERPL: 1.3 {RATIO} (ref 1–2.5)
ALP SERPL-CCNC: 74 U/L (ref 35–104)
ALT SERPL-CCNC: 26 U/L (ref 10–35)
ANION GAP SERPL CALCULATED.3IONS-SCNC: 15 MMOL/L (ref 9–16)
AST SERPL-CCNC: 20 U/L (ref 10–35)
BASOPHILS # BLD: 0.04 K/UL (ref 0–0.2)
BASOPHILS NFR BLD: 0 % (ref 0–2)
BILIRUB DIRECT SERPL-MCNC: 0.1 MG/DL (ref 0–0.2)
BILIRUB INDIRECT SERPL-MCNC: 0.3 MG/DL (ref 0–1)
BILIRUB SERPL-MCNC: 0.4 MG/DL (ref 0–1.2)
BUN SERPL-MCNC: 5 MG/DL (ref 6–20)
CALCIUM SERPL-MCNC: 9.1 MG/DL (ref 8.6–10.4)
CHLORIDE SERPL-SCNC: 107 MMOL/L (ref 98–107)
CO2 SERPL-SCNC: 20 MMOL/L (ref 20–31)
CREAT SERPL-MCNC: 0.8 MG/DL (ref 0.6–0.9)
EOSINOPHIL # BLD: 0.03 K/UL (ref 0–0.44)
EOSINOPHILS RELATIVE PERCENT: 0 % (ref 1–4)
ERYTHROCYTE [DISTWIDTH] IN BLOOD BY AUTOMATED COUNT: 12 % (ref 11.8–14.4)
GFR, ESTIMATED: >90 ML/MIN/1.73M2
GLOBULIN SER CALC-MCNC: 3.2 G/DL
GLUCOSE SERPL-MCNC: 97 MG/DL (ref 74–99)
HAV IGM SERPL QL IA: NONREACTIVE
HBV CORE IGM SERPL QL IA: NONREACTIVE
HBV SURFACE AG SERPL QL IA: NONREACTIVE
HCT VFR BLD AUTO: 40.1 % (ref 36.3–47.1)
HCV AB SERPL QL IA: NONREACTIVE
HGB BLD-MCNC: 13.3 G/DL (ref 11.9–15.1)
HIV 1+2 AB+HIV1 P24 AG SERPL QL IA: NONREACTIVE
IMM GRANULOCYTES # BLD AUTO: 0.08 K/UL (ref 0–0.3)
IMM GRANULOCYTES NFR BLD: 1 %
LYMPHOCYTES NFR BLD: 2.36 K/UL (ref 1.1–3.7)
LYMPHOCYTES RELATIVE PERCENT: 16 % (ref 24–43)
MCH RBC QN AUTO: 30.2 PG (ref 25.2–33.5)
MCHC RBC AUTO-ENTMCNC: 33.2 G/DL (ref 28.4–34.8)
MCV RBC AUTO: 90.9 FL (ref 82.6–102.9)
MONOCYTES NFR BLD: 1.07 K/UL (ref 0.1–1.2)
MONOCYTES NFR BLD: 7 % (ref 3–12)
NEUTROPHILS NFR BLD: 76 % (ref 36–65)
NEUTS SEG NFR BLD: 11.44 K/UL (ref 1.5–8.1)
NRBC BLD-RTO: 0 PER 100 WBC
PLATELET # BLD AUTO: 315 K/UL (ref 138–453)
PMV BLD AUTO: 9.8 FL (ref 8.1–13.5)
POTASSIUM SERPL-SCNC: 3.7 MMOL/L (ref 3.7–5.3)
PROT SERPL-MCNC: 7.2 G/DL (ref 6.6–8.7)
RBC # BLD AUTO: 4.41 M/UL (ref 3.95–5.11)
SODIUM SERPL-SCNC: 142 MMOL/L (ref 136–145)
WBC OTHER # BLD: 15 K/UL (ref 3.5–11.3)

## 2025-07-22 PROCEDURE — 80076 HEPATIC FUNCTION PANEL: CPT

## 2025-07-22 PROCEDURE — 87389 HIV-1 AG W/HIV-1&-2 AB AG IA: CPT

## 2025-07-22 PROCEDURE — 6370000000 HC RX 637 (ALT 250 FOR IP): Performed by: PSYCHIATRY & NEUROLOGY

## 2025-07-22 PROCEDURE — 6370000000 HC RX 637 (ALT 250 FOR IP)

## 2025-07-22 PROCEDURE — 71045 X-RAY EXAM CHEST 1 VIEW: CPT

## 2025-07-22 PROCEDURE — 85025 COMPLETE CBC W/AUTO DIFF WBC: CPT

## 2025-07-22 PROCEDURE — 6360000002 HC RX W HCPCS: Performed by: PSYCHIATRY & NEUROLOGY

## 2025-07-22 PROCEDURE — 80074 ACUTE HEPATITIS PANEL: CPT

## 2025-07-22 PROCEDURE — 99232 SBSQ HOSP IP/OBS MODERATE 35: CPT | Performed by: PSYCHIATRY & NEUROLOGY

## 2025-07-22 PROCEDURE — 2580000003 HC RX 258: Performed by: PSYCHIATRY & NEUROLOGY

## 2025-07-22 PROCEDURE — 36415 COLL VENOUS BLD VENIPUNCTURE: CPT

## 2025-07-22 PROCEDURE — 2060000000 HC ICU INTERMEDIATE R&B

## 2025-07-22 PROCEDURE — 80048 BASIC METABOLIC PNL TOTAL CA: CPT

## 2025-07-22 PROCEDURE — 95720 EEG PHY/QHP EA INCR W/VEEG: CPT | Performed by: PSYCHIATRY & NEUROLOGY

## 2025-07-22 PROCEDURE — 94761 N-INVAS EAR/PLS OXIMETRY MLT: CPT

## 2025-07-22 PROCEDURE — 6360000002 HC RX W HCPCS

## 2025-07-22 PROCEDURE — 2500000003 HC RX 250 WO HCPCS

## 2025-07-22 PROCEDURE — 95714 VEEG EA 12-26 HR UNMNTR: CPT

## 2025-07-22 RX ORDER — CYCLOBENZAPRINE HCL 10 MG
10 TABLET ORAL ONCE
Status: DISCONTINUED | OUTPATIENT
Start: 2025-07-22 | End: 2025-07-23 | Stop reason: HOSPADM

## 2025-07-22 RX ORDER — AZITHROMYCIN 250 MG/1
1000 TABLET, FILM COATED ORAL ONCE
Status: COMPLETED | OUTPATIENT
Start: 2025-07-22 | End: 2025-07-22

## 2025-07-22 RX ORDER — EMTRICITABINE AND TENOFOVIR DISOPROXIL FUMARATE 200; 300 MG/1; MG/1
1 TABLET, FILM COATED ORAL DAILY
Status: DISCONTINUED | OUTPATIENT
Start: 2025-07-23 | End: 2025-07-23 | Stop reason: HOSPADM

## 2025-07-22 RX ORDER — METRONIDAZOLE 500 MG/1
2000 TABLET ORAL ONCE
Status: COMPLETED | OUTPATIENT
Start: 2025-07-22 | End: 2025-07-22

## 2025-07-22 RX ORDER — LEVONORGESTREL 1.5 MG/1
1.5 TABLET ORAL ONCE
Status: COMPLETED | OUTPATIENT
Start: 2025-07-22 | End: 2025-07-22

## 2025-07-22 RX ORDER — ASCORBIC ACID 500 MG
500 TABLET ORAL DAILY
Status: DISCONTINUED | OUTPATIENT
Start: 2025-07-22 | End: 2025-07-23 | Stop reason: HOSPADM

## 2025-07-22 RX ORDER — KETOROLAC TROMETHAMINE 15 MG/ML
15 INJECTION, SOLUTION INTRAMUSCULAR; INTRAVENOUS ONCE
Status: DISCONTINUED | OUTPATIENT
Start: 2025-07-22 | End: 2025-07-23 | Stop reason: HOSPADM

## 2025-07-22 RX ADMIN — LAMOTRIGINE 50 MG: 25 TABLET ORAL at 11:48

## 2025-07-22 RX ADMIN — OXYCODONE HYDROCHLORIDE AND ACETAMINOPHEN 500 MG: 500 TABLET ORAL at 13:41

## 2025-07-22 RX ADMIN — LAMOTRIGINE 50 MG: 25 TABLET ORAL at 20:12

## 2025-07-22 RX ADMIN — SODIUM CHLORIDE, PRESERVATIVE FREE 10 ML: 5 INJECTION INTRAVENOUS at 20:13

## 2025-07-22 RX ADMIN — FOLIC ACID 1 MG: 1 TABLET ORAL at 11:48

## 2025-07-22 RX ADMIN — DIPHENHYDRAMINE HYDROCHLORIDE 25 MG: 50 INJECTION INTRAMUSCULAR; INTRAVENOUS at 02:51

## 2025-07-22 RX ADMIN — AZITHROMYCIN DIHYDRATE 1000 MG: 250 TABLET ORAL at 13:41

## 2025-07-22 RX ADMIN — Medication 100 MG: at 11:49

## 2025-07-22 RX ADMIN — THERA TABS 1 TABLET: TAB at 11:48

## 2025-07-22 RX ADMIN — ACETAMINOPHEN 650 MG: 325 TABLET ORAL at 11:53

## 2025-07-22 RX ADMIN — ACETAMINOPHEN 650 MG: 325 TABLET ORAL at 05:10

## 2025-07-22 RX ADMIN — ACETAMINOPHEN 650 MG: 325 TABLET ORAL at 20:13

## 2025-07-22 RX ADMIN — METRONIDAZOLE 2000 MG: 500 TABLET ORAL at 13:39

## 2025-07-22 RX ADMIN — LEVONORGESTREL 1.5 MG: 1.5 TABLET ORAL at 11:57

## 2025-07-22 RX ADMIN — CEFTRIAXONE SODIUM 500 MG: 500 INJECTION, POWDER, FOR SOLUTION INTRAMUSCULAR; INTRAVENOUS at 12:12

## 2025-07-22 ASSESSMENT — ENCOUNTER SYMPTOMS
SHORTNESS OF BREATH: 0
ABDOMINAL PAIN: 0
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
## 2025-07-22 NOTE — PLAN OF CARE
Problem: Seizure Precautions  Goal: Remains free of injury related to seizures activity  Outcome: Progressing     Problem: Discharge Planning  Goal: Discharge to home or other facility with appropriate resources  Outcome: Progressing     Problem: Pain  Goal: Verbalizes/displays adequate comfort level or baseline comfort level  Outcome: Progressing

## 2025-07-22 NOTE — FLOWSHEET NOTE
07/21/25 2028   Treatment Team Notification   Reason for Communication Change in status   Name of Team Member Notified Ke Swanson MD   Treatment Team Role Resident   Method of Communication Secure Message   Response No new orders   Notification Time 2028     RN notified resident of patient shaking excessively and heart rate becoming tachy, pt became more alert/turning head once ammonia pad was put under pt's nose. Pt able to answer orientation questions correctly, immediately following episode. No new orders at this time.
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
## 2025-07-22 NOTE — CARE COORDINATION
Case Management   Daily Progress Note       Patient Name: Corry Ruiz                   YOB: 2005  Diagnosis: Status epilepticus (HCC) [G40.901]                       GMLOS: 2.8 days  Length of Stay: 2  days    Anticipated Discharge Date: Two or more days before discharge    Readmission Risk (Low < 19, Mod (19-27), High > 27): Readmission Risk Score: 4.5        Current Transitional Plan    [x] Home Independently    [] Home with HC    [] Skilled Nursing Facility    [] Acute Rehabilitation    [] Long Term Acute Care (LTAC)    [] Other:     Plan for the Stay (Medical Management) :  IV antibiotics  HIV testing negitive  Awaiting therapy evaluations          Workflow Continuation (Additional Notes) :  Plan remain for the patient to return home independent        Bren Madrigal RN  July 22, 2025

## 2025-07-22 NOTE — FLOWSHEET NOTE
07/22/25 0504   Treatment Team Notification   Reason for Communication Review case   Name of Team Member Notified Ke Swanson MD   Treatment Team Role Resident   Method of Communication Secure Message   Response No new orders   Notification Time 0445     RN notified of patient becoming febrile and reports feeling \"stuffy/plugged ears and having headaches.\" No new orders.

## 2025-07-22 NOTE — PLAN OF CARE
Problem: Seizure Precautions  Goal: Remains free of injury related to seizures activity  7/22/2025 1813 by Davie Ferguson RN  Outcome: Progressing  7/22/2025 0447 by Zoila Hercules RN  Outcome: Progressing     Problem: Discharge Planning  Goal: Discharge to home or other facility with appropriate resources  7/22/2025 1813 by Davie Ferguson RN  Outcome: Progressing  7/22/2025 0447 by Zoila Hercules RN  Outcome: Progressing     Problem: Pain  Goal: Verbalizes/displays adequate comfort level or baseline comfort level  7/22/2025 1813 by Davie Ferguson RN  Outcome: Progressing  7/22/2025 0447 by Zoila Hercules RN  Outcome: Progressing

## 2025-07-22 NOTE — PROCEDURES
LONG-TERM EEG-VIDEO MONITORING   CLINICAL NEUROPHYSIOLOGY LABORATORY  DEPARTMENT OF NEUROLOGY  [facility]    Patient: Corry Ruiz  Age: 20 y.o.  MRN: [id number]    Referring Physician: Jean Hopkins MD  History: The patient is a 20 y.o. female who presented breakthrough seizure/encephalopathy. This long-term video-EEG monitoring study was performed to determine the nature of the patient's clinical events. The patient is on neuroactive medications.   Corry Ruiz   Current Facility-Administered Medications   Medication Dose Route Frequency Provider Last Rate Last Admin    lamoTRIgine (LAMICTAL) tablet 50 mg  50 mg Oral BID Ignacio Davis MD   50 mg at 07/21/25 2015    folic acid (FOLVITE) tablet 1 mg  1 mg Oral Daily Latrice Boucher MD   1 mg at 07/21/25 1450    sodium chloride flush 0.9 % injection 5-40 mL  5-40 mL IntraVENous 2 times per day Nadege Champion DO   10 mL at 07/21/25 2017    sodium chloride flush 0.9 % injection 5-40 mL  5-40 mL IntraVENous PRN Nadege Champion, DO        0.9 % sodium chloride infusion   IntraVENous PRN Nadege Champion,         potassium chloride (KLOR-CON M) extended release tablet 40 mEq  40 mEq Oral PRN Nadege Champion DO        Or    potassium bicarb-citric acid (EFFER-K) effervescent tablet 40 mEq  40 mEq Oral PRN Nadege Champion, DO        Or    potassium chloride 10 mEq/100 mL IVPB (Peripheral Line)  10 mEq IntraVENous PRN Nadege Champion, DO        magnesium sulfate 2000 mg in 50 mL IVPB premix  2,000 mg IntraVENous PRN Nadege Champion, DO        ondansetron (ZOFRAN-ODT) disintegrating tablet 4 mg  4 mg Oral Q8H PRN Nadege Champion DO        Or    ondansetron (ZOFRAN) injection 4 mg  4 mg IntraVENous Q6H PRN Nadege Champion, DO        polyethylene glycol (GLYCOLAX) packet 17 g  17 g Oral Daily PRN Nadege Champion, DO        acetaminophen (TYLENOL) tablet 650 mg  650 mg Oral Q6H PRN Nadege Champion, DO   650 mg at 07/22/25 0510    Or    acetaminophen (TYLENOL)

## 2025-07-23 VITALS
OXYGEN SATURATION: 98 % | TEMPERATURE: 98 F | HEART RATE: 66 BPM | SYSTOLIC BLOOD PRESSURE: 104 MMHG | RESPIRATION RATE: 18 BRPM | DIASTOLIC BLOOD PRESSURE: 48 MMHG

## 2025-07-23 LAB
ANION GAP SERPL CALCULATED.3IONS-SCNC: 12 MMOL/L (ref 9–16)
BASOPHILS # BLD: 0.04 K/UL (ref 0–0.2)
BASOPHILS NFR BLD: 1 % (ref 0–2)
BUN SERPL-MCNC: 11 MG/DL (ref 6–20)
CALCIUM SERPL-MCNC: 9.5 MG/DL (ref 8.6–10.4)
CHLORIDE SERPL-SCNC: 105 MMOL/L (ref 98–107)
CO2 SERPL-SCNC: 21 MMOL/L (ref 20–31)
CREAT SERPL-MCNC: 0.8 MG/DL (ref 0.6–0.9)
EOSINOPHIL # BLD: 0.17 K/UL (ref 0–0.44)
EOSINOPHILS RELATIVE PERCENT: 2 % (ref 1–4)
ERYTHROCYTE [DISTWIDTH] IN BLOOD BY AUTOMATED COUNT: 12.4 % (ref 11.8–14.4)
GFR, ESTIMATED: >90 ML/MIN/1.73M2
GLUCOSE SERPL-MCNC: 86 MG/DL (ref 74–99)
HCT VFR BLD AUTO: 38.5 % (ref 36.3–47.1)
HGB BLD-MCNC: 13 G/DL (ref 11.9–15.1)
IMM GRANULOCYTES # BLD AUTO: 0.03 K/UL (ref 0–0.3)
IMM GRANULOCYTES NFR BLD: 0 %
LYMPHOCYTES NFR BLD: 2.06 K/UL (ref 1.1–3.7)
LYMPHOCYTES RELATIVE PERCENT: 28 % (ref 24–43)
MAGNESIUM SERPL-MCNC: 2.1 MG/DL (ref 1.6–2.6)
MCH RBC QN AUTO: 29.8 PG (ref 25.2–33.5)
MCHC RBC AUTO-ENTMCNC: 33.8 G/DL (ref 28.4–34.8)
MCV RBC AUTO: 88.3 FL (ref 82.6–102.9)
MONOCYTES NFR BLD: 0.63 K/UL (ref 0.1–1.2)
MONOCYTES NFR BLD: 9 % (ref 3–12)
NEUTROPHILS NFR BLD: 60 % (ref 36–65)
NEUTS SEG NFR BLD: 4.45 K/UL (ref 1.5–8.1)
NRBC BLD-RTO: 0 PER 100 WBC
PLATELET # BLD AUTO: 316 K/UL (ref 138–453)
PMV BLD AUTO: 10 FL (ref 8.1–13.5)
POTASSIUM SERPL-SCNC: 3.5 MMOL/L (ref 3.7–5.3)
RBC # BLD AUTO: 4.36 M/UL (ref 3.95–5.11)
SODIUM SERPL-SCNC: 138 MMOL/L (ref 136–145)
WBC OTHER # BLD: 7.4 K/UL (ref 3.5–11.3)

## 2025-07-23 PROCEDURE — 83735 ASSAY OF MAGNESIUM: CPT

## 2025-07-23 PROCEDURE — 97535 SELF CARE MNGMENT TRAINING: CPT

## 2025-07-23 PROCEDURE — 36415 COLL VENOUS BLD VENIPUNCTURE: CPT

## 2025-07-23 PROCEDURE — 85025 COMPLETE CBC W/AUTO DIFF WBC: CPT

## 2025-07-23 PROCEDURE — 6370000000 HC RX 637 (ALT 250 FOR IP): Performed by: PSYCHIATRY & NEUROLOGY

## 2025-07-23 PROCEDURE — 97530 THERAPEUTIC ACTIVITIES: CPT

## 2025-07-23 PROCEDURE — 80048 BASIC METABOLIC PNL TOTAL CA: CPT

## 2025-07-23 PROCEDURE — 2500000003 HC RX 250 WO HCPCS

## 2025-07-23 PROCEDURE — 6370000000 HC RX 637 (ALT 250 FOR IP)

## 2025-07-23 PROCEDURE — 97162 PT EVAL MOD COMPLEX 30 MIN: CPT

## 2025-07-23 PROCEDURE — 97166 OT EVAL MOD COMPLEX 45 MIN: CPT

## 2025-07-23 PROCEDURE — 99239 HOSP IP/OBS DSCHRG MGMT >30: CPT | Performed by: PSYCHIATRY & NEUROLOGY

## 2025-07-23 RX ORDER — LAMOTRIGINE 25 MG/1
50 TABLET ORAL 2 TIMES DAILY
Qty: 30 TABLET | Refills: 2 | Status: SHIPPED | OUTPATIENT
Start: 2025-07-23

## 2025-07-23 RX ORDER — DARUNAVIR 800 MG/1
800 TABLET, FILM COATED ORAL DAILY
Qty: 26 TABLET | Refills: 0 | Status: SHIPPED | OUTPATIENT
Start: 2025-07-23 | End: 2025-08-18

## 2025-07-23 RX ORDER — LANOLIN ALCOHOL/MO/W.PET/CERES
100 CREAM (GRAM) TOPICAL DAILY
Qty: 30 TABLET | Refills: 0 | Status: SHIPPED | OUTPATIENT
Start: 2025-07-24

## 2025-07-23 RX ORDER — RITONAVIR 100 MG/1
100 CAPSULE ORAL 2 TIMES DAILY
Qty: 52 CAPSULE | Refills: 0 | Status: SHIPPED | OUTPATIENT
Start: 2025-07-23 | End: 2025-07-23

## 2025-07-23 RX ORDER — DARUNAVIR 600 MG/1
600 TABLET, FILM COATED ORAL 2 TIMES DAILY
Qty: 52 TABLET | Refills: 0 | Status: SHIPPED | OUTPATIENT
Start: 2025-07-23 | End: 2025-07-23 | Stop reason: HOSPADM

## 2025-07-23 RX ORDER — RITONAVIR 100 MG/1
100 CAPSULE ORAL DAILY
Qty: 26 CAPSULE | Refills: 0 | Status: SHIPPED | OUTPATIENT
Start: 2025-07-23 | End: 2025-08-18

## 2025-07-23 RX ORDER — ASCORBIC ACID 500 MG
500 TABLET ORAL DAILY
Qty: 30 TABLET | Refills: 0 | Status: SHIPPED | OUTPATIENT
Start: 2025-07-24

## 2025-07-23 RX ORDER — MULTIVITAMIN WITH IRON
1 TABLET ORAL DAILY
Qty: 30 TABLET | Refills: 0 | Status: SHIPPED | OUTPATIENT
Start: 2025-07-24

## 2025-07-23 RX ORDER — EMTRICITABINE AND TENOFOVIR DISOPROXIL FUMARATE 200; 300 MG/1; MG/1
1 TABLET, FILM COATED ORAL DAILY
Qty: 26 TABLET | Refills: 0 | Status: SHIPPED | OUTPATIENT
Start: 2025-07-24 | End: 2025-08-19

## 2025-07-23 RX ORDER — FOLIC ACID 1 MG/1
1 TABLET ORAL DAILY
Qty: 30 TABLET | Refills: 0 | Status: SHIPPED | OUTPATIENT
Start: 2025-07-24

## 2025-07-23 RX ADMIN — LAMOTRIGINE 50 MG: 25 TABLET ORAL at 09:28

## 2025-07-23 RX ADMIN — SODIUM CHLORIDE, PRESERVATIVE FREE 10 ML: 5 INJECTION INTRAVENOUS at 09:29

## 2025-07-23 RX ADMIN — EMTRICITABINE AND TENOFOVIR DISOPROXIL FUMARATE 1 TABLET: 200; 300 TABLET, FILM COATED ORAL at 09:28

## 2025-07-23 RX ADMIN — THERA TABS 1 TABLET: TAB at 09:28

## 2025-07-23 RX ADMIN — Medication 100 MG: at 09:28

## 2025-07-23 RX ADMIN — ACETAMINOPHEN 650 MG: 325 TABLET ORAL at 03:15

## 2025-07-23 RX ADMIN — FOLIC ACID 1 MG: 1 TABLET ORAL at 09:28

## 2025-07-23 RX ADMIN — OXYCODONE HYDROCHLORIDE AND ACETAMINOPHEN 500 MG: 500 TABLET ORAL at 09:28

## 2025-07-23 RX ADMIN — POTASSIUM BICARBONATE 20 MEQ: 782 TABLET, EFFERVESCENT ORAL at 15:04

## 2025-07-23 ASSESSMENT — ENCOUNTER SYMPTOMS
ABDOMINAL PAIN: 0
SHORTNESS OF BREATH: 0
CHEST TIGHTNESS: 0

## 2025-07-23 NOTE — PROGRESS NOTES
Patient discharged at 1807 via family. Patient educated and given discharge instructions. All questions answered. IV removed. Patient left with all belongings.
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
## 2025-07-23 NOTE — PROGRESS NOTES
CLINICAL PHARMACY NOTE: MEDS TO BEDS    Total # of Prescriptions Filled: 4   The following medications were delivered to the patient:  Ritonavir 100mg  Darunavir 800mg  Lamotrigine 25mg  Emtricitabine-Tenofovir 200-300mg    Additional Documentation: delivered to patient in room 116 7/23 ar 4:11pm. No co-pay.   Calm

## 2025-07-23 NOTE — CARE COORDINATION
Case Management   Daily Progress Note       Patient Name: Corry Ruiz                   YOB: 2005  Diagnosis: Status epilepticus (HCC) [G40.901]                       GMLOS: 2.8 days  Length of Stay: 3  days    Anticipated Discharge Date: To be determined    Readmission Risk (Low < 19, Mod (19-27), High > 27): Readmission Risk Score: 4.9        Current Transitional Plan    [] Home Independently    [] Home with HC    [] Skilled Nursing Facility    [] Acute Rehabilitation    [] Long Term Acute Care (LTAC)    [] Other:     Plan for the Stay (Medical Management) :          Workflow Continuation (Additional Notes) :    Pharmacy called requesting voucher for some discharge medications. Voucher faxed.     Mariza Jackman RN  July 23, 2025

## 2025-07-23 NOTE — PROGRESS NOTES
Physical Therapy  Facility/Department: [address] NEURO ICU   Physical Therapy Initial Evaluation    Patient Name: Corry Ruzi        MRN: [id number]    : 2005    Date of Service: 2025    Past Medical History:  has no past medical history on file.  Past Surgical History:  has no past surgical history on file.    Discharge Recommendations   Further therapy recommended at discharge.    PT Equipment Recommendations  Equipment Needed: No  Other: Pt owns RW    Assessment  Body Structures, Functions, Activity Limitations Requiring Skilled Therapeutic Intervention: Decreased functional mobility , Decreased strength, Decreased endurance, Decreased balance, Increased pain  Assessment: Pt amb 3' Elvis+1 RW. Pt is limited by lightheadedness, staring episode, and RLE weakness in ambulation. Pt has knee buckling in gait that is improved w/ use of RW. Pt hsa adequate support available upon discharge. Pt would benefit from continued acute PT to address deficits.  Therapy Prognosis: Good  Decision Making: Medium Complexity  Requires PT Follow-Up: Yes  Activity Tolerance  Activity Tolerance: Patient limited by endurance  Safety Devices  Type of Devices: Call light within reach, Left in chair, Chair alarm in place, Gait belt, Nurse notified, Patient at risk for falls, All fall risk precautions in place    AM-PAC  AM-PAC Basic Mobility - Inpatient   How much help is needed turning from your back to your side while in a flat bed without using bedrails?: None  How much help is needed moving from lying on your back to sitting on the side of a flat bed without using bedrails?: None  How much help is needed moving to and from a bed to a chair?: A Little  How much help is needed standing up from a chair using your arms?: A Little  How much help is needed walking in hospital room?: A Lot  How much help is needed climbing 3-5 steps with a railing?: A Lot  AM-PAC Inpatient Mobility Raw Score : 18  AM-PAC Inpatient T-Scale Score :

## 2025-07-23 NOTE — H&P
Patient admitted overnight.   Patient was transferred to [facility] overnight.   I was not contacted about this and did not see this patient.   Per chart review, neurology was contacted and they requested transfer.

## 2025-07-23 NOTE — PLAN OF CARE
Psychiatry cleared patient for suicide precautions and patient doesn't not need inpatient psychiatry. Patient no longer requires suicide precautions. Discharge planning with psychiatry follow up as outpatient.     Jose Angel Osorio MD  PGY-2, Internal Medicine Resident  [facility] [place]         7/23/2025, 2:18 PM,

## 2025-07-23 NOTE — PROGRESS NOTES
Occupational Therapy Initial Evaluation  Facility/Department: [address] NEURO ICU   Patient Name: Corry Ruiz        MRN: [id number]    : 2005    Date of Service: 2025    No chief complaint on file.    Past Medical History:  has no past medical history on file.  Past Surgical History:  has no past surgical history on file.    Discharge Recommendations   Further therapy recommended at discharge.  Equipment recommendations: shower chair        Assessment  Performance deficits / Impairments: Decreased functional mobility ;Decreased ADL status;Decreased endurance;Decreased high-level IADLs;Decreased balance;Decreased strength;Decreased coordination  Assessment: pt demonstated above deficits impacting occupational performance. pt would benefit from continued Ot servcies in order to maximize safety and functional independence.  Prognosis: Good  Decision Making: Medium Complexity  REQUIRES OT FOLLOW-UP: Yes  Activity Tolerance  Activity Tolerance: Patient limited by endurance  Safety Devices  Type of Devices: Call light within reach, Left in chair, Chair alarm in place, Gait belt, Nurse notified, Patient at risk for falls    AM-PAC  AM-PAC Daily Activity - Inpatient   How much help is needed for putting on and taking off regular lower body clothing?: A Little  How much help is needed for bathing (which includes washing, rinsing, drying)?: A Little  How much help is needed for toileting (which includes using toilet, bedpan, or urinal)?: A Little  How much help is needed for putting on and taking off regular upper body clothing?: A Little  How much help is needed for taking care of personal grooming?: None  How much help for eating meals?: None  AM-PAC Inpatient Daily Activity Raw Score: 20  AM-PAC Inpatient ADL T-Scale Score : 42.03  ADL Inpatient CMS 0-100% Score: 38.32  ADL Inpatient CMS G-Code Modifier : CJ    Restrictions/Precautions  Restrictions/Precautions  Restrictions/Precautions: Seizure  Activity

## 2025-07-23 NOTE — PLAN OF CARE
Problem: Seizure Precautions  Goal: Remains free of injury related to seizures activity  7/23/2025 1810 by Emily Newton RN  Outcome: Adequate for Discharge  7/23/2025 0620 by Ingrid Peña RN  Outcome: Progressing     Problem: Discharge Planning  Goal: Discharge to home or other facility with appropriate resources  7/23/2025 1810 by Emily Newton RN  Outcome: Adequate for Discharge  7/23/2025 0620 by Ingrid Peña RN  Outcome: Progressing     Problem: Pain  Goal: Verbalizes/displays adequate comfort level or baseline comfort level  7/23/2025 1810 by Emily Newton RN  Outcome: Adequate for Discharge  Flowsheets (Taken 7/23/2025 0800 by Brandie Faith RN)  Verbalizes/displays adequate comfort level or baseline comfort level: Encourage patient to monitor pain and request assistance  7/23/2025 0620 by Ingrid Peña RN  Outcome: Progressing

## 2025-07-23 NOTE — PLAN OF CARE
Problem: Seizure Precautions  Goal: Remains free of injury related to seizures activity  7/23/2025 0620 by Ingrid Peña RN  Outcome: Progressing  7/22/2025 1813 by Davie Ferguson RN  Outcome: Progressing     Problem: Discharge Planning  Goal: Discharge to home or other facility with appropriate resources  7/23/2025 0620 by Ingrid Peña RN  Outcome: Progressing  7/22/2025 1813 by Davie Ferguson RN  Outcome: Progressing     Problem: Pain  Goal: Verbalizes/displays adequate comfort level or baseline comfort level  7/23/2025 0620 by Ingrid Peña RN  Outcome: Progressing  7/22/2025 1813 by Davie Ferguson RN  Outcome: Progressing
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
## 2025-07-23 NOTE — DISCHARGE SUMMARY
[facility]     Department of Neurology    INPATIENT DISCHARGE SUMMARY        Patient Identification:  Corry Ruiz is a 20 y.o. female.  :  2005  MRN: [id number]     Acct: [id number]   Admit Date:  2025  Discharge date: 2025    Attending Provider: Joel Rodriguez*                                     Admission Diagnoses:   Status epilepticus (HCC) [G40.901]    Discharge Diagnoses:   Principal Problem:    Status epilepticus (HCC)  Active Problems:    Recurrent seizures (HCC)    History of suicidal ideation    Alcohol abuse    Mild episode of recurrent major depressive disorder  Resolved Problems:    * No resolved hospital problems. *       Consults:   Psychiatry  Forensic Nurse    Brief Inpatient course:    The patient is a 20 y.o. female who originally presented to outside hospital with concern for altered mental status and seizure-like activity on .  Per chart review, patient was brought in by EMS after they were called to a local hotel due to the patient having seizure-like activity.  Allegedly patient has history of seizures when consuming large quantities of alcohol and was consuming alcohol at that time. Ethanol level 128. Patient's mother was contacted and provided further history, stated patient has been hospitalized 4-5 times, most recently in January of this year, after binge drinking with seizure-like activity.  Patient also has a lengthy psychiatric history and previous suicide attempt via Zoloft overdose and she has been pink slipped and placed on suicide precautions at outside hospital for suicidal ideation. Per chart review does not appear the patient is on any antiepileptic medications.  She also denied being on any seizure meds and also denies being on any psych meds at this time.  It apppeared there had been some concern that these may be functional seizures or psychogenic seizures.  Patient was loaded with Briviact 200 mg per Dr. Hopkins

## 2025-07-23 NOTE — DISCHARGE SUMMARY
I did not see this patient as they were admitted overnight and transferred to [facility] from the ICU at the request of the neurologist.
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
## (undated) PROCEDURE — 4A10X4Z MONITORING OF CENTRAL NERVOUS ELECTRICAL ACTIVITY, EXTERNAL APPROACH: ICD-10-PCS